# Patient Record
Sex: FEMALE | Race: WHITE | NOT HISPANIC OR LATINO | Employment: PART TIME
[De-identification: names, ages, dates, MRNs, and addresses within clinical notes are randomized per-mention and may not be internally consistent; named-entity substitution may affect disease eponyms.]

---

## 2019-02-15 ENCOUNTER — RECORDS - HEALTHEAST (OUTPATIENT)
Dept: ADMINISTRATIVE | Facility: OTHER | Age: 59
End: 2019-02-15

## 2019-02-15 LAB
LAB AP CHARGES (HE HISTORICAL CONVERSION): NORMAL
PATH REPORT.COMMENTS IMP SPEC: NORMAL
PATH REPORT.COMMENTS IMP SPEC: NORMAL
PATH REPORT.FINAL DX SPEC: NORMAL
PATH REPORT.GROSS SPEC: NORMAL
PATH REPORT.MICROSCOPIC SPEC OTHER STN: NORMAL
PATH REPORT.MICROSCOPIC SPEC OTHER STN: NORMAL
PATH REPORT.RELEVANT HX SPEC: NORMAL
RESULT FLAG (HE HISTORICAL CONVERSION): NORMAL

## 2020-10-05 ENCOUNTER — OFFICE VISIT - HEALTHEAST (OUTPATIENT)
Dept: FAMILY MEDICINE | Facility: CLINIC | Age: 60
End: 2020-10-05

## 2020-10-05 DIAGNOSIS — G43.009 MIGRAINE WITHOUT AURA AND WITHOUT STATUS MIGRAINOSUS, NOT INTRACTABLE: ICD-10-CM

## 2020-10-05 ASSESSMENT — MIFFLIN-ST. JEOR: SCORE: 1547.46

## 2020-10-13 ENCOUNTER — AMBULATORY - HEALTHEAST (OUTPATIENT)
Dept: FAMILY MEDICINE | Facility: CLINIC | Age: 60
End: 2020-10-13

## 2020-10-13 DIAGNOSIS — G43.009 MIGRAINE WITHOUT AURA AND WITHOUT STATUS MIGRAINOSUS, NOT INTRACTABLE: ICD-10-CM

## 2020-11-05 ENCOUNTER — RECORDS - HEALTHEAST (OUTPATIENT)
Dept: ADMINISTRATIVE | Facility: OTHER | Age: 60
End: 2020-11-05

## 2020-11-05 ENCOUNTER — OFFICE VISIT (OUTPATIENT)
Dept: NEUROLOGY | Facility: CLINIC | Age: 60
End: 2020-11-05
Payer: COMMERCIAL

## 2020-11-05 VITALS
HEIGHT: 70 IN | WEIGHT: 190 LBS | DIASTOLIC BLOOD PRESSURE: 66 MMHG | SYSTOLIC BLOOD PRESSURE: 108 MMHG | HEART RATE: 77 BPM | RESPIRATION RATE: 16 BRPM | BODY MASS INDEX: 27.2 KG/M2

## 2020-11-05 DIAGNOSIS — R20.0 RIGHT ARM NUMBNESS: ICD-10-CM

## 2020-11-05 DIAGNOSIS — M48.02 CERVICAL STENOSIS OF SPINAL CANAL: ICD-10-CM

## 2020-11-05 DIAGNOSIS — G43.009 NONINTRACTABLE MIGRAINE, UNSPECIFIED MIGRAINE TYPE: Primary | ICD-10-CM

## 2020-11-05 PROBLEM — C44.91 BASAL CELL CARCINOMA OF SKIN: Status: ACTIVE | Noted: 2020-11-05

## 2020-11-05 PROBLEM — R87.619 ENDOMETRIAL CELLS ON CERVICAL PAP SMEAR INCONSISTENT WITH LAST MENSTRUAL PERIOD: Status: ACTIVE | Noted: 2017-12-27

## 2020-11-05 PROBLEM — Z83.3 FAMILY HISTORY OF DIABETES MELLITUS: Status: ACTIVE | Noted: 2020-02-21

## 2020-11-05 PROBLEM — D36.9 ADENOMATOUS POLYP: Status: ACTIVE | Noted: 2020-03-17

## 2020-11-05 PROBLEM — E03.9 HYPOTHYROIDISM: Status: ACTIVE | Noted: 2020-11-05

## 2020-11-05 PROCEDURE — 99205 OFFICE O/P NEW HI 60 MIN: CPT | Performed by: PSYCHIATRY & NEUROLOGY

## 2020-11-05 RX ORDER — RIZATRIPTAN BENZOATE 10 MG/1
1 TABLET ORAL PRN
COMMUNITY
Start: 2020-10-13 | End: 2020-11-05

## 2020-11-05 RX ORDER — RIZATRIPTAN BENZOATE 10 MG/1
10 TABLET ORAL PRN
Qty: 18 TABLET | Refills: 11 | Status: SHIPPED | OUTPATIENT
Start: 2020-11-05 | End: 2021-12-01

## 2020-11-05 RX ORDER — GABAPENTIN 300 MG/1
300 CAPSULE ORAL AT BEDTIME
COMMUNITY
Start: 2020-10-05

## 2020-11-05 RX ORDER — CYCLOBENZAPRINE HCL 10 MG
10 TABLET ORAL
COMMUNITY
Start: 2020-01-29 | End: 2023-03-02

## 2020-11-05 ASSESSMENT — MIFFLIN-ST. JEOR: SCORE: 1517.08

## 2020-11-05 NOTE — PATIENT INSTRUCTIONS
Patient Education   About Migraine Headaches  What is a migraine headache?  A migraine is a very painful type of headache. It may last a few hours or days. During a migraine, you may have vision problems and feel sick to your stomach.  Migraines are three times more common in women than in men. Once they start, you may get them for the rest of your life. They may occur less often as you age.  What causes it?  We don't know the exact cause, but many things can trigger a migraine. These include:    Stress and anxiety    Lack of food or sleep    Foods and drinks that contain tyramine, such as:  ? Red preet and some beers  ? Aged cheeses (like cheddar or blue cheese)  ? Yeast  ? Aged, dried or cured meats  ? Fermented foods like sauerkraut, soy sauce, miso and cris chi    Too much light    Chemicals (gasoline, cleaning products, perfume, glue, etc.)    Weather changes    Certain medicines    Hormone changes (in women).  What are symptoms?  Some people can tell when they're about to have a migraine. They may see flashing lights or zigzag lines in front of their eyes. Or they may lose their vision for a short time.  With a migraine, you may:    Feel pain or pulsing on one side of the head. For some people, the entire head hurts.    Be very sensitive to light and sound.    Feel nauseated (sick to your stomach) and vomit (throw up).  How is it treated?  Your care team may suggest medicine to prevent or relieve your symptoms. Once you start having migraines, you may also need medicine to keep them from getting worse.   Take your medicine at the first sign of a migraine. It may take several tries to find a medicine that works for you.   When a migraine comes:    Lie down in a quiet, dark room. Try not to bend over, as this may cause more pain.    Put a cold pack on your head. Try a bag of frozen vegetables, wrapped with a thin cloth.    Drink extra fluids. If you can't drink, suck on ice chips.  How can I prevent  migraines?  It will help to keep a migraine diary. By keeping a diary, you may find the cause of your headaches. Once you know the cause, you can take steps to prevent migraines in the future.  It also helps to live a healthy lifestyle. This means:    Get regular exercise. (If exercise triggers your headaches, tell your doctor.)    Drink plenty of water.    Eat healthy meals at regular times.    Try to go to bed and get up and regular times.    Don't smoke. Avoid second-hand smoke.    Avoid caffeine. Coffee, tea and soda may help a migraine. But if you drink them too often, they can cause migraines.    Find ways to relax, have fun and reduce stress in your life.    Try complementary therapies (yoga, acupuncture, massage, biofeedback, etc.).  When should I call my clinic?  Call your clinic at once if you have new or unusual symptoms, such as:     Pain that gets worse or lasts more than 24 hours.    Slurred speech or problems talking.    A weak arm or leg that you can't move normally.    A fever over 100 F (37.8 C), taken under the tongue.    Stiff neck.    Vomiting (throwing up) for several hours.  For more information about migraines  Contact the [employer] [place] [employer] (ACHE) at 1-998.197.5316 or www.headaches.org.  Local providers of complementary therapies  These services may not be covered by insurance. Check your insurance plan.  [facility]  [phone]   Includes pain education, behavioral therapy,   trigger point injections and more.  [facility]   [phone]   Includes acupuncture, massage, myofascial release.  [facility] the [facility]  [phone]  [email or web address]  Includes mindfulness, meditation, yoga.  Community Education     [place]: sreedhar.mpls.[email or web address].mn.    [place]: commedprograms.spps.org  Look for programs on yoga, mindfulness, etc.  [facility]    [phone]  www.pathways[place].org  Includes mindfulness, yoga, body-mind skills.  For informational purposes only. Not to replace the advice of your health care provider.   Copyright   2011 [employer]. All rights reserved. [employer] 089379 - 11/15.

## 2020-11-05 NOTE — LETTER
11/5/2020         RE: Breana Santos  [address] [address]        Dear Colleague,    Thank you for referring your patient, Breana Santos, to the [facility] NEUROLOGY CLINIC [place]. Please see a copy of my visit note below.    [facility] Neurology  [place]    Breana Santos MRN# [id number]   Age: 60 year old YOB: 1960               Assessment and Plan:   Assessment:   Migraine headaches  Right arm numbness        Plan:   Orders Placed This Encounter   Procedures     MR Cervical Spine w/o Contrast     MR Brain w/o Contrast     EMG     She was not enthusiastic about starting prophylactic medication for the migraines.  We will continue the rizatriptan as needed.  We did discuss different medications to try in the future if needed.  Since there has been an increase in headaches in recent months I would like to get an MRI of the brain just to rule out any structural lesion.  We will also get MRI of the cervical spine given the symptoms of right arm numbness.  We will also have her come back for EMG of that right arm to look for any evidence of nerve root or peripheral nerve impingement.  I will see her when she comes back for the EMG and we should have the MRI results at that time as well.             Chief Complaint/HPI:     I saw Breana for neurologic evaluation today here in our [place] office.  This is a 60-year-old woman with a longstanding history of migraine headaches.  Unfortunately they have gotten worse in recent months.  The began in her 20s, mainly they were related to her menstrual cycle.  Now that she no longer has a cycle the migraines are more frequent.  On average she gets about 6/month.  With keeping track, she noted that she had 6 days of migraine in a row back in September.  There were 7 migraine days in October.  The pain is mainly in the right temporal and central areas.  It is very sharp.  She also has a dull ache near the right  temporomandibular joint.  This is not triggered by chewing or swallowing or light touch on the skin.  With the headaches, she has no aura and no nausea, but she is very sensitive to light and noise.  She takes rizatriptan as needed for the migraines with very good relief.  Her sister had side effects from topiramate so Breana was not enthusiastic about trying that.    Separately she is noticed episodes of numbness in the right arm.  She will wake up with this, she notes that she is not lying on the arm.  She believes that she wakes up with the right arm lying on her abdomen (supine).  She mentions an MRI of the cervical spine a few years ago that did show some degenerative changes.  In the meantime she has had some left neck and shoulder pain which resolved nicely with physical therapy.  She continues doing the exercises though has not been back to the physical therapy clinic with the pandemic quarantine.          Past Medical History:    has a past medical history of Migraines.          Past Surgical History:    has no past surgical history on file.          Social History:     Social History     Tobacco Use     Smoking status: Former Smoker     Packs/day: 0.50     Years: 22.00     Pack years: 11.00     Types: Cigarettes     Smokeless tobacco: Never Used     Tobacco comment: quit in 1998   Substance Use Topics     Alcohol use: Yes     Comment: 1-2 drinks a month              Family History:     Family History   Problem Relation Age of Onset     Diabetes Mother      Heart Failure Mother      Diabetes Father      Heart Failure Father                 Allergies:     Allergies   Allergen Reactions     Shellfish Allergy Anaphylaxis             Medications:     Current Outpatient Medications:      cyclobenzaprine (FLEXERIL) 10 MG tablet, Take 10 mg by mouth nightly as needed, Disp: , Rfl:      gabapentin (NEURONTIN) 300 MG capsule, Take 300 mg by mouth At Bedtime, Disp: , Rfl:      rizatriptan (MAXALT) 10 MG tablet, Take  "1 tablet (10 mg) by mouth as needed for migraine, Disp: 18 tablet, Rfl: 11           Review of Systems:   Other than the above-mentioned symptoms a complete systems review is negative.            Physical Exam:      Vitals: /66 (BP Location: Right arm, Patient Position: Sitting, Cuff Size: Adult Regular)   Pulse 77   Resp 16   Ht 1.778 m (5' 10\")   Wt 86.2 kg (190 lb)   Breastfeeding No   BMI 27.26 kg/m    BMI= Body mass index is 27.26 kg/m .     Patient is alert and oriented x 3 in no acute distress. Neck was supple, no carotid bruits, thyromegaly, lymphadenopathy or JVD noted.   Neurological Exam:   Mental status: Patient is alert and oriented x 3. Speech is clear and fluent with good repetition, comprehension and naming. Patient recalls 3/3 objects at 5 minutes.   Cranial nerves:    CN II: Visual fields are full to confrontation. Fundoscopic exam is normal. PERRLA.   CN III, IV, VI: EOMI.    CN V: Facial sensation intact to pinprick in all 3 divisions bilaterally.    CN VII: Face is symmetric with normal eye closure and smile.   CN VII: Hearing is normal to rubbing fingers   CN IX, X: Palate elevates symmetrically. Phonation is normal. Gag present.   CN XI: Head turning and shoulder shrug are intact   CN XII: Tongue is midline with normal movements and no atrophy or fasciculations.   Motor: Muscle bulk and tone are normal. No pronator drift. Strength is 5/5 bilaterally. No fasciculations noted.   Reflexes: Reflexes are symmetric, diminished throughout. Plantar responses are flexor.   Sensory: Light touch, pinprick, and vibration sense are intact bilaterally.    Coordination: Rapid alternating movements and fine finger movements are intact. There is no dysmetria on finger-to-nose and heel-knee-shin.    Gait: Posture is normal. Gait is steady with normal steps, base, arm swing, and turning.    I did review records available through care everywhere including MRI of the cervical spine done through the " [facility]         Sean Villalpando MD             Again, thank you for allowing me to participate in the care of your patient.        Sincerely,        Sean Villalpando MD

## 2020-11-05 NOTE — PROGRESS NOTES
[facility] Neurology  [place]    Breana Santos MRN# [id number]   Age: 60 year old YOB: 1960               Assessment and Plan:   Assessment:   Migraine headaches  Right arm numbness        Plan:   Orders Placed This Encounter   Procedures     MR Cervical Spine w/o Contrast     MR Brain w/o Contrast     EMG     She was not enthusiastic about starting prophylactic medication for the migraines.  We will continue the rizatriptan as needed.  We did discuss different medications to try in the future if needed.  Since there has been an increase in headaches in recent months I would like to get an MRI of the brain just to rule out any structural lesion.  We will also get MRI of the cervical spine given the symptoms of right arm numbness.  We will also have her come back for EMG of that right arm to look for any evidence of nerve root or peripheral nerve impingement.  I will see her when she comes back for the EMG and we should have the MRI results at that time as well.             Chief Complaint/HPI:     I saw Breana for neurologic evaluation today here in our [place] office.  This is a 60-year-old woman with a longstanding history of migraine headaches.  Unfortunately they have gotten worse in recent months.  The began in her 20s, mainly they were related to her menstrual cycle.  Now that she no longer has a cycle the migraines are more frequent.  On average she gets about 6/month.  With keeping track, she noted that she had 6 days of migraine in a row back in September.  There were 7 migraine days in October.  The pain is mainly in the right temporal and central areas.  It is very sharp.  She also has a dull ache near the right temporomandibular joint.  This is not triggered by chewing or swallowing or light touch on the skin.  With the headaches, she has no aura and no nausea, but she is very sensitive to light and noise.  She takes rizatriptan as needed for the migraines with very good relief.   Her sister had side effects from topiramate so Breana was not enthusiastic about trying that.    Separately she is noticed episodes of numbness in the right arm.  She will wake up with this, she notes that she is not lying on the arm.  She believes that she wakes up with the right arm lying on her abdomen (supine).  She mentions an MRI of the cervical spine a few years ago that did show some degenerative changes.  In the meantime she has had some left neck and shoulder pain which resolved nicely with physical therapy.  She continues doing the exercises though has not been back to the physical therapy clinic with the pandemic quarantine.          Past Medical History:    has a past medical history of Migraines.          Past Surgical History:    has no past surgical history on file.          Social History:     Social History     Tobacco Use     Smoking status: Former Smoker     Packs/day: 0.50     Years: 22.00     Pack years: 11.00     Types: Cigarettes     Smokeless tobacco: Never Used     Tobacco comment: quit in 1998   Substance Use Topics     Alcohol use: Yes     Comment: 1-2 drinks a month              Family History:     Family History   Problem Relation Age of Onset     Diabetes Mother      Heart Failure Mother      Diabetes Father      Heart Failure Father                 Allergies:     Allergies   Allergen Reactions     Shellfish Allergy Anaphylaxis             Medications:     Current Outpatient Medications:      cyclobenzaprine (FLEXERIL) 10 MG tablet, Take 10 mg by mouth nightly as needed, Disp: , Rfl:      gabapentin (NEURONTIN) 300 MG capsule, Take 300 mg by mouth At Bedtime, Disp: , Rfl:      rizatriptan (MAXALT) 10 MG tablet, Take 1 tablet (10 mg) by mouth as needed for migraine, Disp: 18 tablet, Rfl: 11           Review of Systems:   Other than the above-mentioned symptoms a complete systems review is negative.            Physical Exam:      Vitals: /66 (BP Location: Right arm, Patient  "Position: Sitting, Cuff Size: Adult Regular)   Pulse 77   Resp 16   Ht 1.778 m (5' 10\")   Wt 86.2 kg (190 lb)   Breastfeeding No   BMI 27.26 kg/m    BMI= Body mass index is 27.26 kg/m .     Patient is alert and oriented x 3 in no acute distress. Neck was supple, no carotid bruits, thyromegaly, lymphadenopathy or JVD noted.   Neurological Exam:   Mental status: Patient is alert and oriented x 3. Speech is clear and fluent with good repetition, comprehension and naming. Patient recalls 3/3 objects at 5 minutes.   Cranial nerves:    CN II: Visual fields are full to confrontation. Fundoscopic exam is normal. PERRLA.   CN III, IV, VI: EOMI.    CN V: Facial sensation intact to pinprick in all 3 divisions bilaterally.    CN VII: Face is symmetric with normal eye closure and smile.   CN VII: Hearing is normal to rubbing fingers   CN IX, X: Palate elevates symmetrically. Phonation is normal. Gag present.   CN XI: Head turning and shoulder shrug are intact   CN XII: Tongue is midline with normal movements and no atrophy or fasciculations.   Motor: Muscle bulk and tone are normal. No pronator drift. Strength is 5/5 bilaterally. No fasciculations noted.   Reflexes: Reflexes are symmetric, diminished throughout. Plantar responses are flexor.   Sensory: Light touch, pinprick, and vibration sense are intact bilaterally.    Coordination: Rapid alternating movements and fine finger movements are intact. There is no dysmetria on finger-to-nose and heel-knee-shin.    Gait: Posture is normal. Gait is steady with normal steps, base, arm swing, and turning.    I did review records available through care everywhere including MRI of the cervical spine done through the [facility].         Sean Villalpando MD         "

## 2020-12-16 DIAGNOSIS — G43.009 NONINTRACTABLE MIGRAINE, UNSPECIFIED MIGRAINE TYPE: Primary | ICD-10-CM

## 2020-12-16 NOTE — TELEPHONE ENCOUNTER
Cataract symptoms i.e., glare, blur discussed. Pt to call if worsening vision or trouble with driving, TV, reading, ADL. UV precautions. Reviewed possibility of future cataract surgery. Dr. Villalpando-  Pt requesting Ativan to take prior to MRI 1/4/21.  Maxalt refill was already sent on 11/5/20, with 11 refills.  Arleth Harrison LPN on 12/16/2020 at 3:45 PM

## 2020-12-16 NOTE — TELEPHONE ENCOUNTER
Pt calling for a refill of Maxalt and the need for Ativan sedation for MRI. She uses [facility] on [address]. Call if questions [phone]

## 2020-12-17 RX ORDER — LORAZEPAM 1 MG/1
2 TABLET ORAL ONCE
Qty: 2 TABLET | Refills: 0 | Status: SHIPPED | OUTPATIENT
Start: 2020-12-17 | End: 2020-12-17

## 2021-01-04 ENCOUNTER — HOSPITAL ENCOUNTER (OUTPATIENT)
Dept: MRI IMAGING | Facility: CLINIC | Age: 61
Discharge: HOME | End: 2021-01-04
Attending: PSYCHIATRY & NEUROLOGY

## 2021-01-04 DIAGNOSIS — R20.0 RIGHT ARM NUMBNESS: ICD-10-CM

## 2021-01-04 DIAGNOSIS — G43.009 NONINTRACTABLE MIGRAINE: ICD-10-CM

## 2021-01-04 DIAGNOSIS — M48.02 CERVICAL STENOSIS OF SPINAL CANAL: ICD-10-CM

## 2021-01-27 ENCOUNTER — OFFICE VISIT (OUTPATIENT)
Dept: NEUROLOGY | Facility: CLINIC | Age: 61
End: 2021-01-27
Attending: PSYCHIATRY & NEUROLOGY
Payer: COMMERCIAL

## 2021-01-27 VITALS
HEIGHT: 70 IN | DIASTOLIC BLOOD PRESSURE: 51 MMHG | SYSTOLIC BLOOD PRESSURE: 99 MMHG | HEART RATE: 84 BPM | BODY MASS INDEX: 27.2 KG/M2 | WEIGHT: 190 LBS | RESPIRATION RATE: 16 BRPM

## 2021-01-27 DIAGNOSIS — R20.0 RIGHT ARM NUMBNESS: ICD-10-CM

## 2021-01-27 DIAGNOSIS — M48.02 CERVICAL STENOSIS OF SPINAL CANAL: ICD-10-CM

## 2021-01-27 DIAGNOSIS — G43.009 NONINTRACTABLE MIGRAINE, UNSPECIFIED MIGRAINE TYPE: ICD-10-CM

## 2021-01-27 DIAGNOSIS — G43.009 NONINTRACTABLE MIGRAINE, UNSPECIFIED MIGRAINE TYPE: Primary | ICD-10-CM

## 2021-01-27 PROCEDURE — 95909 NRV CNDJ TST 5-6 STUDIES: CPT | Performed by: PSYCHIATRY & NEUROLOGY

## 2021-01-27 PROCEDURE — 99214 OFFICE O/P EST MOD 30 MIN: CPT | Performed by: PSYCHIATRY & NEUROLOGY

## 2021-01-27 PROCEDURE — 95886 MUSC TEST DONE W/N TEST COMP: CPT | Mod: RT | Performed by: PSYCHIATRY & NEUROLOGY

## 2021-01-27 ASSESSMENT — MIFFLIN-ST. JEOR: SCORE: 1517.08

## 2021-01-27 NOTE — LETTER
1/27/2021         RE: Vanessa Santos  [address] [address]        Dear Colleague,    Thank you for referring your patient, Vanessa Santos, to the [facility] NEUROLOGY CLINIC [place]. Please see a copy of my visit note below.    [facility] Neurology  [place]    Vanessa Santos MRN# [id number]   Age: 60 year old YOB: 1960               Assessment and Plan:   Assessment:   Migraine headaches  Right arm numbness, resolved        Plan:   Orders Placed This Encounter   Procedures     XR Great Occipital Nerve Block Injection     She will continue with the rizatriptan as needed.  I suggested Fioricet as well so that she could alternate headache medications (in an effort to reduce rebound headache) but she was not enthusiastic about the caffeine involved since her headaches can come on at night.  In lieu of that I gave her the option to take ibuprofen combined with Tylenol and alternate that with the rizatriptan.    We will send her for occipital nerve block on the right given the distribution of the headache.             Chief Complaint/HPI:     I saw this patient for follow-up today.  We had her come in for an EMG to evaluate episodes of right arm numbness (especially upon awakening).  Since we saw her in November, she got a new pillow with more support and actually feels that her symptoms have improved dramatically.  She does still get fairly frequent migraine headaches with light and sound sensitivity but no nausea.  The headache seems to start posteriorly on the right and spreads forward to the temporal area.  Maxalt is effective for her but she worries that she is taking it too often and could get rebound headache.  She might have a headache for 3 days in a row and then go a few days without headache.            Past Medical History:    has a past medical history of Migraines.          Past Surgical History:    has no past surgical history on file.           "Social History:     Social History     Tobacco Use     Smoking status: Former Smoker     Packs/day: 0.50     Years: 22.00     Pack years: 11.00     Types: Cigarettes     Smokeless tobacco: Never Used     Tobacco comment: quit in 1998   Substance Use Topics     Alcohol use: Yes     Comment: 1-2 drinks a month              Family History:     Family History   Problem Relation Age of Onset     Diabetes Mother      Heart Failure Mother      Diabetes Father      Heart Failure Father                 Allergies:     Allergies   Allergen Reactions     Shellfish Allergy Anaphylaxis             Medications:     Current Outpatient Medications:      cyclobenzaprine (FLEXERIL) 10 MG tablet, Take 10 mg by mouth nightly as needed, Disp: , Rfl:      rizatriptan (MAXALT) 10 MG tablet, Take 1 tablet (10 mg) by mouth as needed for migraine, Disp: 18 tablet, Rfl: 11     gabapentin (NEURONTIN) 300 MG capsule, Take 300 mg by mouth At Bedtime, Disp: , Rfl:               Physical Exam:     BP 99/51 (BP Location: Left arm, Patient Position: Sitting, Cuff Size: Adult Regular)   Pulse 84   Resp 16   Ht 1.778 m (5' 10\")   Wt 86.2 kg (190 lb)   LMP  (LMP Unknown)   Breastfeeding No   BMI 27.26 kg/m     Awake, alert, no aphasia, no dysarthria  Oriented x3, attention is fine  Cranial nerves II - XII tested and intact, no nystagmus  There is no focal or generalized weakness in the extremities  Gait is normal     MRI of the cervical spine shows some wear and tear, moderate canal stenosis at C5-6 with foraminal stenosis there as well (though EMG did not show any sign of radiculopathy).  Images personally reviewed      Sean Villalpando MD             Again, thank you for allowing me to participate in the care of your patient.        Sincerely,        Sean Villalpando MD    "

## 2021-01-27 NOTE — PROGRESS NOTES
[facility] Neurology  [place]    Vanessa Santos MRN# [id number]   Age: 60 year old YOB: 1960               Assessment and Plan:   Assessment:   Migraine headaches  Right arm numbness, resolved        Plan:   Orders Placed This Encounter   Procedures     XR Great Occipital Nerve Block Injection     She will continue with the rizatriptan as needed.  I suggested Fioricet as well so that she could alternate headache medications (in an effort to reduce rebound headache) but she was not enthusiastic about the caffeine involved since her headaches can come on at night.  In lieu of that I gave her the option to take ibuprofen combined with Tylenol and alternate that with the rizatriptan.    We will send her for occipital nerve block on the right given the distribution of the headache.             Chief Complaint/HPI:     I saw this patient for follow-up today.  We had her come in for an EMG to evaluate episodes of right arm numbness (especially upon awakening).  Since we saw her in November, she got a new pillow with more support and actually feels that her symptoms have improved dramatically.  She does still get fairly frequent migraine headaches with light and sound sensitivity but no nausea.  The headache seems to start posteriorly on the right and spreads forward to the temporal area.  Maxalt is effective for her but she worries that she is taking it too often and could get rebound headache.  She might have a headache for 3 days in a row and then go a few days without headache.            Past Medical History:    has a past medical history of Migraines.          Past Surgical History:    has no past surgical history on file.          Social History:     Social History     Tobacco Use     Smoking status: Former Smoker     Packs/day: 0.50     Years: 22.00     Pack years: 11.00     Types: Cigarettes     Smokeless tobacco: Never Used     Tobacco comment: quit in 1998   Substance Use Topics     Alcohol  "use: Yes     Comment: 1-2 drinks a month              Family History:     Family History   Problem Relation Age of Onset     Diabetes Mother      Heart Failure Mother      Diabetes Father      Heart Failure Father                 Allergies:     Allergies   Allergen Reactions     Shellfish Allergy Anaphylaxis             Medications:     Current Outpatient Medications:      cyclobenzaprine (FLEXERIL) 10 MG tablet, Take 10 mg by mouth nightly as needed, Disp: , Rfl:      rizatriptan (MAXALT) 10 MG tablet, Take 1 tablet (10 mg) by mouth as needed for migraine, Disp: 18 tablet, Rfl: 11     gabapentin (NEURONTIN) 300 MG capsule, Take 300 mg by mouth At Bedtime, Disp: , Rfl:               Physical Exam:     BP 99/51 (BP Location: Left arm, Patient Position: Sitting, Cuff Size: Adult Regular)   Pulse 84   Resp 16   Ht 1.778 m (5' 10\")   Wt 86.2 kg (190 lb)   LMP  (LMP Unknown)   Breastfeeding No   BMI 27.26 kg/m     Awake, alert, no aphasia, no dysarthria  Oriented x3, attention is fine  Cranial nerves II - XII tested and intact, no nystagmus  There is no focal or generalized weakness in the extremities  Gait is normal     MRI of the cervical spine shows some wear and tear, moderate canal stenosis at C5-6 with foraminal stenosis there as well (though EMG did not show any sign of radiculopathy).  Images personally reviewed      Sean Villalpando MD         "

## 2021-01-27 NOTE — LETTER
1/27/2021         RE: Vanessa Santos  [address] [address]        Dear Colleague,    Thank you for referring your patient, Vanessa Santos, to the [facility] [place]. Please see a copy of my visit note below.    Epic requires a note here      Again, thank you for allowing me to participate in the care of your patient.        Sincerely,        Sean Villalpando MD

## 2021-01-27 NOTE — PROCEDURES
[facility]     Formerly [facility]  [address], [address]  [place] [address]  Tel: [phone]  Fax: [phone]          Full Name: Vanessa Santos Gender: Female  Patient ID: [id number] YOB: 1960      Visit Date: 1/27/2021 08:02  Age: 60 Years 11 Months Old  Interpreted By: Sean Villalpando M.D.   Ref Dr.: Geraldine Carr MD  Tech: ST   Height: 5 feet 10 inch  Reason for referral: Evaluate right upper. c/o numbness in right shoulder, radiate down right arm for a month. Feel symptoms has resolved due to changing pillow.       Motor NCS      Nerve / Sites Lat Amp Dist Leno    ms mV cm m/s   R Median - APB      Wrist 3.65 7.1 7       Elbow 8.28 7.2 26 56   R Ulnar - ADM      Wrist 3.13 11.1 7       B.Elbow 6.35 10.0 20.5 63      A.Elbow 8.07 9.7 10 58       F  Wave      Nerve Fmin    ms   R Ulnar - ADM 27.71       Sensory NCS      Nerve / Sites Onset Lat Pk Lat Amp.2-3 Dist Lat Diff    ms ms  V cm ms   R Median - II (Antidr)      Wrist 2.34 3.02 67.0 13    R Ulnar - V (Antidr)      Wrist 2.19 2.81 43.5 11    R Median, Ulnar - Transcarpal comparison      Median Palm 1.51 2.08 44.7 8       Ulnar Palm 1.51 1.98 27.5 8         0.10       EMG Summary Table     Spontaneous MUAP Rcmt Note   Muscle Fib PSW Fasc IA # Amp Dur PPP Rate Type   R. Brachioradialis None None None N N N N N N N   R. Pronator teres None None None N N N N N N N   R. Biceps brachii None None None N N N N N N N   R. Deltoid None None None N N N N N N N   R. Triceps brachii None None None N N N N N N N   R. Flexor carpi ulnaris None None None N N N N N N N   R. First dorsal interosseous None None None N N N N N N N   R. Abductor pollicis brevis None None None N N N N N N N   R. Supraspinatus None None None N N N N N N N   R. Infraspinatus None None None N N N N N N N     History:  This patient is a 60-year-old woman with episodes of numbness in the right shoulder and arm and  a history of cervical spine stenosis.  This EMG is performed to evaluate for nerve root impingement.  Of note, symptoms have improved with change in pillow.    Findings:  Motor nerve conduction studies of the right median and right ulnar nerves are well within normal limits.  F-wave latency in the right ulnar nerve is normal.    Sensory studies of the right median and ulnar nerves are normal with robust amplitudes.    Needle EMG of selected muscles throughout the right arm including shoulder girdle muscles showed no abnormal spontaneous activity and motor units of normal configuration and recruitment.    Conclusion:  This is a normal EMG of the right arm.

## 2021-02-22 ENCOUNTER — TELEPHONE (OUTPATIENT)
Dept: NEUROLOGY | Facility: CLINIC | Age: 61
End: 2021-02-22

## 2021-02-22 NOTE — TELEPHONE ENCOUNTER
Please advise. I see the order states left sided?   Miguel Rudolph, SHAKIR on 2/22/2021 at 12:29 PM

## 2021-02-22 NOTE — TELEPHONE ENCOUNTER
Candace with [facility] left msg requesting to specify right or left or bilateral for the occipital block. [phone] (condifential vm - can leave msg)

## 2021-02-23 ENCOUNTER — TRANSFERRED RECORDS (OUTPATIENT)
Dept: HEALTH INFORMATION MANAGEMENT | Facility: CLINIC | Age: 61
End: 2021-02-23

## 2021-03-07 ENCOUNTER — HEALTH MAINTENANCE LETTER (OUTPATIENT)
Age: 61
End: 2021-03-07

## 2021-06-05 VITALS
DIASTOLIC BLOOD PRESSURE: 82 MMHG | HEIGHT: 70 IN | SYSTOLIC BLOOD PRESSURE: 122 MMHG | BODY MASS INDEX: 28.32 KG/M2 | WEIGHT: 197.8 LBS | HEART RATE: 80 BPM

## 2021-06-12 NOTE — PROGRESS NOTES
ASSESSMENT:  1. Migraine without aura and without status migrainosus, not intractable  - eletriptan (RELPAX) 20 MG tablet; Take 1 tablet (20 mg total) by mouth as needed. may repeat in 2 hours if necessary  Dispense: 20 tablet; Refill: 2  - Ambulatory referral to Neurology  - gabapentin (NEURONTIN) 300 MG capsule; Take 1 capsule (300 mg total) by mouth 2 (two) times a day as needed.  Dispense: 60 capsule; Refill: 0      PLAN:  I did review with her the previous management of the migraine.  I did give a prescription of Relpax since it was effective for her will hope that insurance will cover it at this point.  I encouraged her to increase the dosage of the gabapentin since she is not going to take the Topamax so that was either be helpful for her.  Will have her referred to see the headache clinic at the [facility].  She is encouraged to call to let us know if there is any other concerns that she might have.  Otherwise we will see her for follow-up as needed.    Orders Placed This Encounter   Procedures     Influenza, Recombinant, Inj, Quadrivalent, PF, 18+YRS     Ambulatory referral to Neurology     Referral Priority:   Routine     Referral Type:   Consultation     Referral Reason:   Evaluation and Treatment     Referral Location:   [facility]     Requested Specialty:   Neurology     Number of Visits Requested:   1     Medications Discontinued During This Encounter   Medication Reason     SUMAtriptan (IMITREX) 50 MG tablet      gabapentin (NEURONTIN) 100 MG capsule Reorder       No follow-ups on file.      CHIEF COMPLAINT:  Chief Complaint   Patient presents with     Headache     recurrent, hx of migraines, getting more frequent, in the last week she has gotten 5       HISTORY OF PRESENT ILLNESS:  Breana is a 60 y.o. female presenting to the clinic today as a new patient she has a diagnosis of migraine which was done a long time ago and noted that she has been able to control it but noted that this time  around she has not been able to control it.  She noted having increasing migraines sometimes up to 5 times in a week.  She denies having any nausea vomiting.  Noted that the headache is mainly along the right side temple.  Also pain noted around the right side behind the eye.  She does have some sensitivity to light as well as to noise..  Noted that it appears to happen more at night and wake her at night.  She also has some pain noted in the front of the ear which the  the dentist thought that it is a facial nerve problem.  She also has a past history of cervical radiculopathy which she have managed with physical therapy and is much better at this point.  She does take gabapentin for the neck pain.  She takes her Imitrex which initially has been quite helpful but currently does not help at all.  She was prescribed recently with Topamax but she was afraid to take it with gabapentin because of mental issues that it might give which she noted a cystic duct.  She is not really sure what to do at this point.  She did take the tablet of Relpax from her brother and that did help.     REVIEW OF SYSTEMS:   She denied having any difficulty sleeping other than being woken up, she does not snore and noted no recent abnormal weight gain.  She does not have any lightheadedness and no problems with her balance.  No smoking, noted that she also does not drink since alcohol gives her migraine.  Denied any other symptoms at this point and feels well otherwise.  All other systems are negative.    PFSH:  Reviewed, as below.    Social History     Tobacco Use   Smoking Status Former Smoker     Quit date: 10/5/2013     Years since quittin.0   Smokeless Tobacco Never Used       Family History   Problem Relation Age of Onset     Migraines Sister      Migraines Brother        Social History     Socioeconomic History     Marital status:      Spouse name: Not on file     Number of children: Not on file     Years of  education: Not on file     Highest education level: Not on file   Occupational History     Not on file   Social Needs     Financial resource strain: Not on file     Food insecurity     Worry: Not on file     Inability: Not on file     Transportation needs     Medical: Not on file     Non-medical: Not on file   Tobacco Use     Smoking status: Former Smoker     Quit date: 10/5/2013     Years since quittin.0     Smokeless tobacco: Never Used   Substance and Sexual Activity     Alcohol use: Not on file     Comment: Not frequently     Drug use: Not on file     Sexual activity: Yes     Partners: Male   Lifestyle     Physical activity     Days per week: Not on file     Minutes per session: Not on file     Stress: Not on file   Relationships     Social connections     Talks on phone: Not on file     Gets together: Not on file     Attends Confucianism service: Not on file     Active member of club or organization: Not on file     Attends meetings of clubs or organizations: Not on file     Relationship status: Not on file     Intimate partner violence     Fear of current or ex partner: Not on file     Emotionally abused: Not on file     Physically abused: Not on file     Forced sexual activity: Not on file   Other Topics Concern     Not on file   Social History Narrative     Not on file       Past Surgical History:   Procedure Laterality Date     BREAST SURGERY      Lumpectomy for Hyperplasia     TONSILECTOMY, ADENOIDECTOMY, BILATERAL MYRINGOTOMY AND TUBES         Allergies   Allergen Reactions     Shellfish Containing Products Anaphylaxis       Active Ambulatory Problems     Diagnosis Date Noted     No Active Ambulatory Problems     Resolved Ambulatory Problems     Diagnosis Date Noted     No Resolved Ambulatory Problems     No Additional Past Medical History       Current Outpatient Medications   Medication Sig Dispense Refill     gabapentin (NEURONTIN) 300 MG capsule Take 1 capsule (300 mg total) by mouth 2 (two) times a  "day as needed. 60 capsule 0     eletriptan (RELPAX) 20 MG tablet Take 1 tablet (20 mg total) by mouth as needed. may repeat in 2 hours if necessary 20 tablet 2     No current facility-administered medications for this visit.        VITALS:  Vitals:    10/05/20 1005   BP: 122/82   Patient Site: Left Arm   Patient Position: Sitting   Cuff Size: Adult Large   Pulse: 80   Weight: 197 lb 12.8 oz (89.7 kg)   Height: 5' 10\" (1.778 m)     Wt Readings from Last 3 Encounters:   10/05/20 197 lb 12.8 oz (89.7 kg)     Body mass index is 28.38 kg/m .    PHYSICAL EXAM:  General Appearance: Alert, cooperative, no distress, appears stated age  HEENT: Pupils are equal and reactive, extraocular motions is normal.  Oropharynx is moist.  Neck is supple no notable thyromegaly.  External ears are normal.  Examination of the temporomandibular joint was done, there was no clicks noted mild discomfort noted on the right side otherwise exam was normal.  There is no tenderness around the temple.  There is a mild muscle tension on the neck bilaterally.  Lungs: Clear to auscultation bilaterally, respirations unlabored  Heart: Regular rhythm and normal rate,S1 and S2 normal, no murmur, rub, or gallop  Abdomen: Soft, no masses no organs are palpable.  Musculoskeletal: Normal range of motion. No joint swelling or deformity.   Skin: Normal skin  turgor no skin lesions.  Neurologic:  Alert and oriented times 3. Normal reflexes. Cranial nerves II-XII intact.   Psychiatric: Normal mood and affect.    MEDICATIONS:  Current Outpatient Medications   Medication Sig Dispense Refill     gabapentin (NEURONTIN) 300 MG capsule Take 1 capsule (300 mg total) by mouth 2 (two) times a day as needed. 60 capsule 0     eletriptan (RELPAX) 20 MG tablet Take 1 tablet (20 mg total) by mouth as needed. may repeat in 2 hours if necessary 20 tablet 2     No current facility-administered medications for this visit.                  "

## 2021-06-18 ENCOUNTER — OFFICE VISIT - HEALTHEAST (OUTPATIENT)
Dept: PODIATRY | Facility: CLINIC | Age: 61
End: 2021-06-18

## 2021-06-18 DIAGNOSIS — M72.2 PLANTAR FASCIITIS: ICD-10-CM

## 2021-06-18 DIAGNOSIS — M24.573 EQUINUS CONTRACTURE OF ANKLE: ICD-10-CM

## 2021-06-26 NOTE — PROGRESS NOTES
FOOT AND ANKLE SURGERY/PODIATRY Progress Note        ASSESSMENT:   Plantar Fasciitis  Gastrosoleus Equinus       TREATMENT:  -Patient has pain along the plantar heel at insertion of plantar fascia consistent with plantar fasciitis. We discussed treatment options to include stretching exercises, anti-inflammatory medication, orthotics, steroid injections, physical therapy and a night splint.     -All questions invited and answered. I will start her on a 12 day tapered course of prednisone and have referred her to [facility] for custom orthotics. Also referred to physical therapy.     -I have asked her to follow-up after using the orthotics x3-4 weeks if symptoms continue.     Kar Sethi DPM  [facility] Podiatry/Foot & Ankle Surgery      HPI: I was asked to see Vanessa Santos today for bilateral foot pain. The patient complains of heel pain on both feet which has been present for several years. She admits using orthotics x10 years which are worn and in need or replacing. She has used ibuprofen almost daily with some stomach irritation.     History reviewed. No pertinent past medical history.    Past Surgical History:   Procedure Laterality Date     BREAST SURGERY      Lumpectomy for Hyperplasia     TONSILECTOMY, ADENOIDECTOMY, BILATERAL MYRINGOTOMY AND TUBES         Allergies   Allergen Reactions     Shellfish Containing Products Anaphylaxis         Current Outpatient Medications:      cyclobenzaprine (FLEXERIL) 10 MG tablet, Take 10 mg by mouth daily as needed. , Disp: , Rfl:      LORazepam (ATIVAN) 1 MG tablet, TAKE 1 TABLET 1 HOUR BEFORE MRI. MAY REPEAT ONCE 15 MINUTES BEFORE MRI., Disp: , Rfl:      rizatriptan (MAXALT) 10 MG tablet, Take 1 tab at onset of Headache,may repeat in 2 hours if needed. Max 30 mg /24 hours., Disp: 30 tablet, Rfl: 0     eletriptan (RELPAX) 20 MG tablet, Take 1 tablet (20 mg total) by mouth as needed. may repeat in 2 hours if necessary, Disp: 20 tablet, Rfl: 2     gabapentin  (NEURONTIN) 300 MG capsule, Take 1 capsule (300 mg total) by mouth 2 (two) times a day as needed., Disp: 60 capsule, Rfl: 0     hydrocortisone 2.5 % ointment, , Disp: , Rfl:      ketoconazole (NIZORAL) 2 % cream, , Disp: , Rfl:      meloxicam (MOBIC) 15 MG tablet, Take 15 mg by mouth., Disp: , Rfl:      predniSONE (DELTASONE) 10 mg tablet, 40mg x3 days, 30mg x3 days, 20mg x3 days, 10mg x3 days., Disp: 30 tablet, Rfl: 0     tiZANidine (ZANAFLEX) 2 MG tablet, TAKE 1 TO 2 TABLETS BY MOUTH AT BEDTIME. DO NOT DRIVE OR GO TO WORK WHILE TAKING THIS, Disp: , Rfl:     Family History   Problem Relation Age of Onset     Migraines Sister      Migraines Brother        Social History     Socioeconomic History     Marital status:      Spouse name: Not on file     Number of children: Not on file     Years of education: Not on file     Highest education level: Not on file   Occupational History     Not on file   Social Needs     Financial resource strain: Not on file     Food insecurity     Worry: Not on file     Inability: Not on file     Transportation needs     Medical: Not on file     Non-medical: Not on file   Tobacco Use     Smoking status: Former Smoker     Quit date: 10/5/2013     Years since quittin.7     Smokeless tobacco: Never Used   Substance and Sexual Activity     Alcohol use: Not on file     Comment: Not frequently     Drug use: Never     Sexual activity: Yes     Partners: Male   Lifestyle     Physical activity     Days per week: Not on file     Minutes per session: Not on file     Stress: Not on file   Relationships     Social connections     Talks on phone: Not on file     Gets together: Not on file     Attends Buddhism service: Not on file     Active member of club or organization: Not on file     Attends meetings of clubs or organizations: Not on file     Relationship status: Not on file     Intimate partner violence     Fear of current or ex partner: Not on file     Emotionally abused: Not on file      Physically abused: Not on file     Forced sexual activity: Not on file   Other Topics Concern     Not on file   Social History Narrative     Not on file       Review of Systems - 10 point Review of Systems is negative except for arch pain which is noted in HPI.    OBJECTIVE:  Appearance: alert, well appearing, and in no distress.    General appearance: Patient is alert and fully cooperative with history & exam.  No sign of distress is noted during the visit.     Psychiatric: Affect is pleasant & appropriate.  Patient appears motivated to improve health.     Respiratory: Breathing is regular & unlabored while sitting.     HEENT: Hearing is intact to spoken word.  Speech is clear.  No gross evidence of visual impairment that would impact ambulation.    Vascular: Dorsalis pedis and posterior tibial pulses are palpable. There is pedal hair growth bilateral.  CFT < 3 sec from anterior tibial surface to distal digits bilateral. There is no appreciable edema noted.  Dermatologic: Turgor and texture are within normal limits. No coloration or temperature changes. No primary or secondary lesions noted.  Neurologic: All epicritic and proprioceptive sensations are grossly intact bilateral.  Musculoskeletal: Mild pain along the plantar bilateral heels at the insertion of the plantar fascia. Limited ankle dorsiflexion with knee extended and flexed.     Imaging:     No results found.

## 2021-07-04 NOTE — PATIENT INSTRUCTIONS - HE
Patient Instructions by Sulma Govea RN at 6/18/2021  3:00 PM     Author: Sulma Govea RN Service: -- Author Type: Registered Nurse    Filed: 6/18/2021  3:24 PM Encounter Date: 6/18/2021 Status: Addendum    : Sulma Govea RN (Registered Nurse)    Related Notes: Original Note by Sulma Govea RN (Registered Nurse) filed at 6/18/2021  3:21 PM       Someone from physical therapy will call you to schedule.      Take oral prednisone as prescribed.    Get orthotics:  [facility] (Please call to make an appointment)    [facility] [place] [id number], [address]  [place], [place].  Phone: [phone]        What is Plantar Fasciitis?  Plantar Fasciitis also referred to as heel pain syndrome is the most common cause cited for pain in heels. Plantar Fasciitis is the pain and inflammation at the point where the flat band of tissue called the plantar fascia connects the heel bone to the toes. Plantar fascia maintains the arch of the foot. Applying pressure on it will make it swollen, weak, and irritated. This will make the heel of your foot to ache when you walk or stand for a prolonged period.    Symptoms  Most people suffering from Plantar Fasciitis experience pain when they walk after resting their feet for a long duration. You may experience less pain and stiffness after a few steps. But your foot may hurt more as the day goes on. It may hurt the most when you climb stairs or after you stand for a long time. Continuous foot pain at night might be due to different problems like arthritis or nerve problems.    Causes  Straining the ligament which supports the arch can cause Plantar Fasciitis. Repeated straining can cause tiny tears in the ligament which lead to pain and swelling. Plantar Fasciitis is very evident in cases of:  Tight Achilles tendon or calf muscles   Running long distances, especially on hard & uneven surfaces    Problems of the foot arch   Sudden obesity   Wearing shoes with soft soles or poor arch support   In-toeing    Treatment Options  Anti-Inflammatory Medication   - Ibuprofen 600 mg by mouth 3 times per day with food     (discontinue if stomach irritation occurs)    - Topical pain creams from [employer]     * apply 1-2 grams to painful areas 3 times per day prior to      Stretching    - Oral Prednisone  Orthotics (functional orthotics) Insurance Code:  (custome made, doctor prescribed)   Steroid injections    - Maximum of 3 injections in one year.   Night Splint   Physical therapy    - Stretching, Ultrasound, etc...

## 2021-07-06 VITALS — SYSTOLIC BLOOD PRESSURE: 106 MMHG | RESPIRATION RATE: 18 BRPM | HEART RATE: 80 BPM | DIASTOLIC BLOOD PRESSURE: 76 MMHG

## 2021-07-15 ENCOUNTER — HOSPITAL ENCOUNTER (OUTPATIENT)
Dept: PHYSICAL THERAPY | Facility: REHABILITATION | Age: 61
End: 2021-07-15
Payer: COMMERCIAL

## 2021-07-15 DIAGNOSIS — M72.2 PLANTAR FASCIITIS: Primary | ICD-10-CM

## 2021-07-15 PROCEDURE — 97140 MANUAL THERAPY 1/> REGIONS: CPT | Mod: GP | Performed by: PHYSICAL THERAPIST

## 2021-07-15 NOTE — PROGRESS NOTES
[facility] Outpatient Physical Therapy Daily Progress Note  Patient Name: Vanessa Santos  Date of evaluation: 7/5/2021  Today's Date: 7/15/2021   Visit Number: 2 of 12 per PT POC (no specific ins limit)  Referring provider: Dr. Sethi  Referral Diagnosis: Plantar Fasciitis  Visit Diagnosis:     ICD-10-CM    1. Plantar fasciitis  M72.2        Assessment:      Patient is working on home stretches and will be getting orthotics soon (had fitting for them last week).  Continues to benefit from further PT.     Goals:  Pt. will demonstrate/verbalize independence in self-management of condition: PROGRESSING TOWARD    Pt will: improve plantar fascia pain in bilateral feet to <3/10 in order to work a full shift at work: PROGRESSING TOWARD     Plan:      Plan for next visit: continue with manual therapy on bilat feet. Look at gait closer next session.  Continue with kinesiotape if it was helpful.  Continue to discuss shoewear (discussed that patient could trial danskos more often to offload the plantar fascia, but needs to keep stretching). Discussed that she could trial a night splint - see if she decided to get one.   Patient not taking her steroid pack yet, see if she decided to start this.    Patient is in agreement with PT plan of care and goals.        Subjective:       4/10 pain in heels    [place] the tape was helpful.     Is off work now for a few weeks.     Did get fitting for orthotics.    Functional limitations:   walking  Activities previously enjoyed, but limited: going for walks with spouse haven't been able to happen d/t pain      Objective:      Still noting myofascial restrictions in bilateral plantar fascia.       Treatment Today     TREATMENT MINUTES COMMENTS   Evaluation     Self-care/ Home management  Discussion of possibly trying Danskos more often to offload the stretch on the Achilles and plantar fascia.   Discussion of trying night splint  Ed on purpose of K tape.    Manual therapy 30 Patient  prone: bilateral gastroc/soleus, plantar fascia MFR. Manual stretching into dorsiflexion    K tape to prmote arch lift: double I strip from lateral mid toot pulling under foot to mid arch and up to dorsum of foot curving to go up the shin.    Neuromuscular Re-education     Therapeutic Activity     Therapeutic Exercises  [id number]   educated patient on hip ER strengthening and foot doming to help with how the foot hits the ground.   Gait training     Modality__________________                Total 30    Blank areas are intentional and mean the treatment did not include these items.     Mai Floyd, PT, CLT  7/15/2021

## 2021-07-21 ENCOUNTER — HOSPITAL ENCOUNTER (OUTPATIENT)
Dept: PHYSICAL THERAPY | Facility: REHABILITATION | Age: 61
End: 2021-07-21
Payer: COMMERCIAL

## 2021-07-21 DIAGNOSIS — M72.2 PLANTAR FASCIITIS: Primary | ICD-10-CM

## 2021-07-21 PROCEDURE — 97140 MANUAL THERAPY 1/> REGIONS: CPT | Mod: GP | Performed by: PHYSICAL THERAPIST

## 2021-07-21 NOTE — PROGRESS NOTES
"[facility] Outpatient Physical Therapy Daily Progress Note  Patient Name: Vanessa Santos  Date of evaluation: 7/5/2021  Today's Date: 7/21/2021   Visit Number: 3 of 12 per PT POC (no specific ins limit)  Referring provider: Dr. Sethi  Referral Diagnosis: Plantar Fasciitis  Visit Diagnosis:     ICD-10-CM    1. Plantar fasciitis  M72.2        Assessment:      Patient is starting to notice some slight improvements. She feels less pain, but the pain has not gone away at any time. She isn't sure if the manual therapy, the exercises, or just taking time off work is what is helping, but overall, less pain.   She will get her orthotics soon, but hasn't heard from orthotist about when they are done yet.    Goals:  Pt. will demonstrate/verbalize independence in self-management of condition: PROGRESSING TOWARD    Pt will: improve plantar fascia pain in bilateral feet to <3/10 in order to work a full shift at work: PROGRESSING TOWARD     Plan:      Plan for next visit: continue with manual therapy on bilat feet. Look at gait closer next session.  Continue with kinesiotape if it was helpful.    Patient is in agreement with PT plan of care and goals.        Subjective:       \"mild\" pain in heels. Tape seems helpful.     Functional limitations:   walking  Activities previously enjoyed, but limited: going for walks with spouse haven't been able to happen d/t pain      Objective:      Still noting myofascial restrictions in bilateral plantar fascia.       Treatment Today     TREATMENT MINUTES COMMENTS   Evaluation     Self-care/ Home management  Discussion of possibly trying Dans[employer] more often to offload the stretch on the Achilles and plantar fascia.   Discussion of trying night splint  Ed on purpose of K tape.    Manual therapy 40 Patient prone: bilateral gastroc/soleus, plantar fascia MFR. Manual stretching into dorsiflexion    K tape to prmote arch lift: double I strip from lateral mid toot pulling under foot to mid arch " and up to dorsum of foot curving to go up the shin.    Neuromuscular Re-education     Therapeutic Activity     Therapeutic Exercises  [id number]   educated patient on hip ER strengthening and foot doming to help with how the foot hits the ground.   Gait training     Modality__________________                Total 40 Was able to see patient longer today.   Blank areas are intentional and mean the treatment did not include these items.     Mai Floyd, PT, CLT  7/21/2021

## 2021-07-26 ENCOUNTER — HOSPITAL ENCOUNTER (OUTPATIENT)
Dept: PHYSICAL THERAPY | Facility: REHABILITATION | Age: 61
End: 2021-07-26
Payer: COMMERCIAL

## 2021-07-26 DIAGNOSIS — M72.2 PLANTAR FASCIITIS: Primary | ICD-10-CM

## 2021-07-26 PROCEDURE — 97140 MANUAL THERAPY 1/> REGIONS: CPT | Mod: GP | Performed by: PHYSICAL THERAPIST

## 2021-07-26 NOTE — PROGRESS NOTES
[facility] Outpatient Physical Therapy Daily Progress Note  Patient Name: Vanessa Santos  Date of evaluation: 7/5/2021  Today's Date: 7/26/2021    Visit Number: 4 of 12 per PT POC (no specific ins limit)  Referring provider: Dr. Sethi  Referral Diagnosis: Plantar Fasciitis  Visit Diagnosis:     ICD-10-CM    1. Plantar fasciitis  M72.2        Assessment:      Patient is having less pain in left foot especially. She feels the k tape is helping her feet pain. She has not received her orthotics yet, she thinks this week they may be in.   Patient continues to benefit from further PT.     Goals:  Pt. will demonstrate/verbalize independence in self-management of condition: PROGRESSING TOWARD    Pt will: improve plantar fascia pain in bilateral feet to <3/10 in order to work a full shift at work: PROGRESSING TOWARD     Plan:      Plan for next visit: continue with manual therapy on bilat feet.   Continue with kinesiotape if it was helpful.    Patient is in agreement with PT plan of care and goals.        Subjective:       Left foot feeling better.   Does work today, 5.5 hour shift. Will be a good test of her symptoms.     Functional limitations:   walking  Activities previously enjoyed, but limited: going for walks with spouse haven't been able to happen d/t pain      Objective:      Gait: noticeable overpronation bilaterally.     Still noting myofascial restrictions in bilateral plantar fascia.       Treatment Today     TREATMENT MINUTES COMMENTS   Evaluation     Self-care/ Home management  Discussion of possibly trying [employer] more often to offload the stretch on the Achilles and plantar fascia.   Discussion of trying night splint  Ed on purpose of K tape.    Manual therapy 29 Patient prone: bilateral gastroc/soleus, plantar fascia MFR. Manual stretching into dorsiflexion    K tape to prmote arch lift: double I strip from lateral mid toot pulling under foot to mid arch and up to dorsum of foot curving to go up the  shin.    Neuromuscular Re-education     Therapeutic Activity     Therapeutic Exercises  [id number]   educated patient on hip ER strengthening and foot doming to help with how the foot hits the ground.   Gait training     Modality__________________                Total 29    Blank areas are intentional and mean the treatment did not include these items.     Mai Floyd, PT, CLT

## 2021-08-04 ENCOUNTER — HOSPITAL ENCOUNTER (OUTPATIENT)
Dept: PHYSICAL THERAPY | Facility: REHABILITATION | Age: 61
End: 2021-08-04
Payer: COMMERCIAL

## 2021-08-04 DIAGNOSIS — M72.2 PLANTAR FASCIITIS: Primary | ICD-10-CM

## 2021-08-04 PROCEDURE — 97140 MANUAL THERAPY 1/> REGIONS: CPT | Mod: GP | Performed by: PHYSICAL THERAPIST

## 2021-08-04 NOTE — PROGRESS NOTES
[facility] Outpatient Physical Therapy Daily Progress Note  Patient Name: Vanessa Santos  Date of evaluation: 7/5/2021  Today's Date: 8/4/2021    Visit Number: 5 of 12 per PT POC (no specific ins limit)  Referring provider: Dr. Sethi  Referral Diagnosis: Plantar Fasciitis  Visit Diagnosis:     ICD-10-CM    1. Plantar fasciitis  M72.2        Assessment:      Patient does feel improvement on a whole with regard to her foot pain, though when she overdoes it, her right foot especially will have more pain. She is noticing that she recovers quickly after pain flares.   Patient still waiting on orthotics and is considering night splint.   She will continue to work on home exercises and follow up in 3-4 weeks again.     Goals:  Pt. will demonstrate/verbalize independence in self-management of condition: PROGRESSING TOWARD    Pt will: improve plantar fascia pain in bilateral feet to <3/10 in order to work a full shift at work:NOT MET, and not returning to work.     Plan:      Plan for next visit: continue with manual therapy on bilat feet. Continue with kinesiotape if it was helpful  Patient will return in 3-4 weeks    Patient is in agreement with PT plan of care and goals.      Subjective:       Did not feel good after her last shift she had. She states she is not going to return to that job anyway. She had a flare with helping her son move last weekend, but she recovers by the next day. She feels this is progress.    Functional limitations:   walking  Activities previously enjoyed, but limited: going for walks with spouse haven't been able to happen d/t pain      Objective:      Gait: noticeable overpronation bilaterally.     Still noting myofascial restrictions in bilateral plantar fascia, but less apparent.     No orthotics yet.      Treatment Today     TREATMENT MINUTES COMMENTS   Evaluation     Self-care/ Home management  Discussion of possibly trying Dans[employer] more often to offload the stretch on the Achilles and  plantar fascia.   Discussion of trying night splint  Ed on purpose of K tape.    Manual therapy 29 Patient prone: bilateral gastroc/soleus, plantar fascia MFR. Manual stretching into dorsiflexion    K tape to prmote arch lift: double I strip from lateral mid toot pulling under foot to mid arch and up to dorsum of foot curving to go up the shin.    Neuromuscular Re-education     Therapeutic Activity     Therapeutic Exercises  [id number]   educated patient on hip ER strengthening and foot doming to help with how the foot hits the ground.   Gait training     Modality__________________                Total 29    Blank areas are intentional and mean the treatment did not include these items.     Mai Floyd, PT, CLT

## 2021-08-25 ENCOUNTER — HOSPITAL ENCOUNTER (OUTPATIENT)
Dept: PHYSICAL THERAPY | Facility: REHABILITATION | Age: 61
End: 2021-08-25
Payer: COMMERCIAL

## 2021-08-25 DIAGNOSIS — M72.2 PLANTAR FASCIITIS: Primary | ICD-10-CM

## 2021-08-25 PROCEDURE — 97140 MANUAL THERAPY 1/> REGIONS: CPT | Mod: GP | Performed by: PHYSICAL THERAPIST

## 2021-08-25 PROCEDURE — 97535 SELF CARE MNGMENT TRAINING: CPT | Mod: GP | Performed by: PHYSICAL THERAPIST

## 2021-08-25 NOTE — PROGRESS NOTES
[facility] Outpatient Physical Therapy Discharge Summary  Patient Name: Vanessa Santos  Date of evaluation: 7/5/2021  Today's Date: 8/4/2021    Visit Number: 6 of 12 per PT POC (no specific ins limit)  Referring provider: Dr. Sethi  Referral Diagnosis: Plantar Fasciitis  Visit Diagnosis:     ICD-10-CM    1. Plantar fasciitis  M72.2            Assessment:      Patient is feeling good with regard to her pain. She has orthotics, and took steroid, since then her pain has been significantly reduced. We discussed home management plan today. No further PT indicated at this time. Discharge episode of care.    Goals:  Pt. will demonstrate/verbalize independence in self-management of condition: MET    Pt will: improve plantar fascia pain in bilateral feet to <3/10 in order to work a full shift at work: NOT Necessary (not returning to work), however, patient has been able to walk 3 miles without pain.      Plan:      Discharge PT    Subjective:       Doing well. Likes orthotics. Went on a trip and walked 3 miles every day without issues.    Objective:      Gait: noticeable overpronation bilaterally, much improved with orthotics in shoes today.     Less plantar fascia restrictions noted upon palpation, not tender to patient.         Treatment Today     TREATMENT MINUTES COMMENTS   Evaluation     Self-care/ Home management 10 revieqwed her home program with strengthening intrinsics and stretching bottom of foot.   Manual therapy 20 Patient prone: bilateral gastroc/soleus, plantar fascia MFR. Manual stretching into dorsiflexion   Neuromuscular Re-education     Therapeutic Activity     Therapeutic Exercises  [id number]   educated patient on hip ER strengthening and foot doming to help with how the foot hits the ground.   Gait training     Modality__________________                Total 30    Blank areas are intentional and mean the treatment did not include these items.     Mai Floyd, PT, CLT

## 2021-10-11 ENCOUNTER — HEALTH MAINTENANCE LETTER (OUTPATIENT)
Age: 61
End: 2021-10-11

## 2021-12-01 DIAGNOSIS — G43.009 NONINTRACTABLE MIGRAINE, UNSPECIFIED MIGRAINE TYPE: ICD-10-CM

## 2021-12-01 RX ORDER — RIZATRIPTAN BENZOATE 10 MG/1
10 TABLET ORAL
Qty: 18 TABLET | Refills: 0 | Status: SHIPPED | OUTPATIENT
Start: 2021-12-01 | End: 2022-08-31

## 2021-12-01 NOTE — TELEPHONE ENCOUNTER
Refill request for rizatriptan.  Previous pt of Dr. Villalpando. Last seen 1/27/21.  A message has been left for pt to call and schedule with another provider as Dr. Villalpando is no longer here.  Medication T'd for review and signature    Arleth Harrison LPN on 12/1/2021 at 1:14 PM

## 2021-12-01 NOTE — TELEPHONE ENCOUNTER
Melonie sent a refill request for Rizatriptan 10 mg 1 tab PRN. I have LM fore pt to call and make a appt.

## 2022-04-19 ENCOUNTER — HOSPITAL ENCOUNTER (OUTPATIENT)
Dept: MAMMOGRAPHY | Facility: CLINIC | Age: 62
Discharge: HOME | End: 2022-04-19
Attending: FAMILY MEDICINE | Admitting: FAMILY MEDICINE
Payer: COMMERCIAL

## 2022-04-19 DIAGNOSIS — Z12.31 VISIT FOR SCREENING MAMMOGRAM: ICD-10-CM

## 2022-04-19 PROCEDURE — 77067 SCR MAMMO BI INCL CAD: CPT

## 2022-08-31 ENCOUNTER — OFFICE VISIT (OUTPATIENT)
Dept: NEUROLOGY | Facility: CLINIC | Age: 62
End: 2022-08-31
Payer: COMMERCIAL

## 2022-08-31 VITALS
BODY MASS INDEX: 25.97 KG/M2 | SYSTOLIC BLOOD PRESSURE: 115 MMHG | HEIGHT: 70 IN | WEIGHT: 181.4 LBS | HEART RATE: 70 BPM | DIASTOLIC BLOOD PRESSURE: 73 MMHG

## 2022-08-31 DIAGNOSIS — Z79.899 ENCOUNTER FOR LONG-TERM (CURRENT) USE OF MEDICATIONS: ICD-10-CM

## 2022-08-31 DIAGNOSIS — Z82.49 FAMILY HISTORY OF VASCULAR DISEASE: ICD-10-CM

## 2022-08-31 DIAGNOSIS — G43.009 MIGRAINE WITHOUT AURA AND WITHOUT STATUS MIGRAINOSUS, NOT INTRACTABLE: Primary | ICD-10-CM

## 2022-08-31 DIAGNOSIS — G43.009 NONINTRACTABLE MIGRAINE, UNSPECIFIED MIGRAINE TYPE: ICD-10-CM

## 2022-08-31 PROCEDURE — 99215 OFFICE O/P EST HI 40 MIN: CPT | Performed by: PSYCHIATRY & NEUROLOGY

## 2022-08-31 RX ORDER — RIZATRIPTAN BENZOATE 10 MG/1
10 TABLET ORAL
Qty: 18 TABLET | Refills: 5 | Status: SHIPPED | OUTPATIENT
Start: 2022-08-31 | End: 2023-03-02

## 2022-08-31 NOTE — LETTER
8/31/2022         RE: Vanessa Santos  [address] [address]        Dear Colleague,    Thank you for referring your patient, Vanessa Santos, to the [facility] [place]. Please see a copy of my visit note below.    INITIAL NEUROLOGY CONSULTATION - Transfer of Care    DATE OF VISIT: 8/31/2022  MRN: [id number]  PATIENT NAME: Vanessa Santso  YOB: 1960    REFERRING PROVIDER: No ref. provider found    Chief Complaint   Patient presents with     Headache     Transfer care Dr. Villalpando- facial pain       SUBJECTIVE:                                                      HPI:   Vanessa Santos is a 62 year old female here to establish care for facial pain/headaches.  Per chart review, the patient previously followed with Dr. Villalpando, most recently seen in 1.2021.  Plan at that time was to do alternating rizatriptan and Fioricet as needed and an occipital nerve block on the right.  Over-the-counter medications recommended as well, alternating the rizatriptan with ibuprofen + Tylenol.  She was complaining of right arm this initially but this had improved.  She described posterior right-sided headache spreading forward to the temporal area.  EMG of the right arm was normal.  Brain MRI showed some age-related changes.  Multilevel degenerative changes on cervical MRI.    Vanessa is here alone today.  She says her migraines have changed some in distribution and that the used to always be behind the right eye but now the pain is more prominently in the temple.  Recent ESR was normal.  She says the pain does still at times migrated from the right occiput to the front.  She does endorse some occipital tenderness when she is in pain.  She does not think the occipital nerve block did anything in terms of decreasing her migraines.  She recalls that around that time when she last saw Dr. Villalpando she was having more frequent headaches.  She did have 2 episodes recently of visual  aura, not followed by headache.  The first was right after her COVID-vaccine, the other 6 months later.  No aura or visual changes with her typical migraines.    Triggers: Dehydration, alcohol.    More recently she has developed some facial pain on the Right as well. She saw a head and neck specialist pain specialist and will be doing physical therapy for TMJ dysfunction.  She does endorse some neck pain with her headaches.  This has improved from previous, when she was also having that arm numbness which remains resolved.    Her headaches have been less frequent over the past 6 months. She takes the rizatriptan several times per month. A bad month would be 10 headaches.  She is better about taking the rizatriptan right away rather than waiting.  It makes her little sleepy but otherwise she tolerates it well and it is almost always effective.  Rarely she takes a second dose.  Typically she gets her migraines in the morning. Without treatment: 2-3 days.  She denies nausea, dizziness, weakness or sensory changes with her headaches.  She does endorse light and sound sensitivity.    She says that she has tried motrin without effect.    She was also Flexeril for her jaw pain which does help.   She says that the gabapentin was started when she was having the increased neck pain and numbness.    Vanessa's mother had migraines.  She also had cerebrovascular disease, significant and untreated, noted on her death certificate.  Vanessa wonders about her own vessel health.      Past Medical History:   Diagnosis Date     Migraines      Past Surgical History:   Procedure Laterality Date     BREAST SURGERY Left 04/15/2015    Lumpectomy for Hyperplasia     TONSILLECTOMY, ADENOIDECTOMY, MYRINGOTOMY, INSERT TUBE BILATERAL, COMBINED         cyclobenzaprine (FLEXERIL) 10 MG tablet, Take 10 mg by mouth nightly as needed  gabapentin (NEURONTIN) 300 MG capsule, Take 300 mg by mouth At Bedtime (Patient not taking: Reported on 8/31/2022)    No  "current facility-administered medications on file prior to visit.    Allergies   Allergen Reactions     Shellfish Allergy Anaphylaxis        Problem (# of Occurrences) Relation (Name,Age of Onset)    Breast Cancer (2) Maternal Aunt (50), Maternal Aunt (50)    Diabetes (2) Mother, Father    Heart Failure (2) Mother, Father    Migraines (2) Sister, Brother        Social History     Tobacco Use     Smoking status: Former Smoker     Packs/day: 0.50     Years: 22.00     Pack years: 11.00     Types: Cigarettes     Quit date: 10/5/2013     Years since quittin.9     Smokeless tobacco: Never Used     Tobacco comment: quit in    Substance Use Topics     Alcohol use: Yes     Comment: Alcoholic Drinks/day: 2 drinks a week     Drug use: Never       REVIEW OF SYSTEMS:                                                      10-point review of systems is negative except as mentioned above in HPI.     EXAM:                                                      Physical Exam:   Vitals: /73 (BP Location: Right arm, Patient Position: Sitting)   Pulse 70   Ht 1.778 m (5' 10\")   Wt 82.3 kg (181 lb 6.4 oz)   LMP  (LMP Unknown)   BMI 26.03 kg/m    BMI= Body mass index is 26.03 kg/m .  GENERAL: NAD.  HEENT: NC/AT. No TTP of head or neck.   CV: RRR. S1, S2.   NECK: No bruits.  PULM: Non-labored breathing.   Neurologic:  MENTAL STATUS: Alert, attentive. Speech is fluent. Normal comprehension. Normal concentration. Adequate fund of knowledge.   CRANIAL NERVES: Discs flat. Visual fields intact to confrontation. Pupils equally, round and reactive to light. Facial sensation and movement normal. EOM full. Hearing intact to conversation. Trapezius strength intact. Palate moves symmetrically. Tongue midline.  MOTOR: 5/5 in proximal and distal muscle groups of upper and lower extremities. Tone and bulk normal.   DTRs: Intact and symmetric in biceps, BR, patellae.  Babinski down-going bilaterally.   SENSATION: Normal light touch and " pinprick. Vibration: Slightly decreased at both ankles.   COORDINATION: Normal finger nose finger.   STATION AND GAIT: Casual gait is normal.  Right hand-dominant.    Relevant Data:  MRI Brain and Cervical Spine (1.4.21):  IMPRESSION:  HEAD MRI:  1.  No acute/subacute infarction, intracranial hemorrhage, mass effect, or hydrocephalus.  2.  Presumed sequelae of mild chronic small vessel ischemic disease.     CERVICAL SPINE MRI:  1.  Multilevel degenerative changes of the cervical spine as described in detail above. When compared with 02/03/2020, there has been mild interval progression of degenerative changes, though appearance may be mildly accentuated by neck extension.  2.  At C5-C6, there is moderate spinal canal stenosis with mild flattening of the spinal cord contour and severe bilateral neuroforaminal stenosis.  3.  At C6-C7, there is mild spinal canal stenosis and severe right and mild left neuroforaminal stenosis.  4.  At C4-C5, there is moderate spinal canal stenosis and mild to moderate right and moderate left neuroforaminal stenosis.    Nerve conduction studies/EMG (1.27.22):  Findings:  Motor nerve conduction studies of the right median and right ulnar nerves are well within normal limits.  F-wave latency in the right ulnar nerve is normal.     Sensory studies of the right median and ulnar nerves are normal with robust amplitudes.     Needle EMG of selected muscles throughout the right arm including shoulder girdle muscles showed no abnormal spontaneous activity and motor units of normal configuration and recruitment.     Conclusion:  This is a normal EMG of the right arm.    Imaging reviewed independently by me.  Agree with radiology read    ASSESSMENT and PLAN:                                                      Assessment:     ICD-10-CM    1. Migraine without aura and without status migrainosus, not intractable  G43.009 CTA Head Neck with Contrast     Pain Management  Referral   2.  Nonintractable migraine, unspecified migraine type  G43.009 rizatriptan (MAXALT) 10 MG tablet     CTA Head Neck with Contrast     Pain Management  Referral   3. Encounter for long-term (current) use of medications  Z79.899    4. Family history of vascular disease  Z82.49         Ms. Santos is a pleasant 62-year-old woman here to establish care for migraine headaches.  She has additional pain issues with neck muscle tension and TMJ dysfunction.  I agree with her plan for treatment with PT for the latter.  Her migraines are currently under good control, with as needed treatment.  We did talk about acupuncture as an option for prevention going forward.  Because of her family history I think it would be reasonable to do imaging of the head and neck vessels.  We will plan to follow-up again in 6 months.  Vanessa agrees and will let me know if anything changes in the meantime.    Plan:  -- Continue the rizatriptan as needed for migraine symptoms.  -- Vessel imaging: CTA of the head and neck.  We will notify you of the results.  -- Consider acupuncture treatments for headache prevention.  Order is in.  -- PT as planned for the jaw/facial pain.  -- Return to neurology clinic in 6 months.  Please let us know if any concerns arise in the meantime.    Total Time: 40 minutes were spent with the patient and in chart review/documentation (as described above in Assessment and Plan) /coordinating the care on date of service.    Jocelyn Puga MD  Neurology    CC: Faiza Dumont MD and Marilou Mathews MD    Dragon software used in the dictation of this note.        Again, thank you for allowing me to participate in the care of your patient.        Sincerely,        Jocelyn Puga MD

## 2022-08-31 NOTE — NURSING NOTE
Chief Complaint   Patient presents with     Headache     Transfer care Dr. Villalpando- facial pain     Marlo Johnson CMA@ on 8/31/2022 at 12:55 PM

## 2022-08-31 NOTE — PROGRESS NOTES
INITIAL NEUROLOGY CONSULTATION - Transfer of Care    DATE OF VISIT: 8/31/2022  MRN: [id number]  PATIENT NAME: Vanessa Santos  YOB: 1960    REFERRING PROVIDER: No ref. provider found    Chief Complaint   Patient presents with     Headache     Transfer care Dr. Villalpando- facial pain       SUBJECTIVE:                                                      HPI:   Vanessa Santos is a 62 year old female here to establish care for facial pain/headaches.  Per chart review, the patient previously followed with Dr. Villalpando, most recently seen in 1.2021.  Plan at that time was to do alternating rizatriptan and Fioricet as needed and an occipital nerve block on the right.  Over-the-counter medications recommended as well, alternating the rizatriptan with ibuprofen + Tylenol.  She was complaining of right arm this initially but this had improved.  She described posterior right-sided headache spreading forward to the temporal area.  EMG of the right arm was normal.  Brain MRI showed some age-related changes.  Multilevel degenerative changes on cervical MRI.    Vanessa is here alone today.  She says her migraines have changed some in distribution and that the used to always be behind the right eye but now the pain is more prominently in the temple.  Recent ESR was normal.  She says the pain does still at times migrated from the right occiput to the front.  She does endorse some occipital tenderness when she is in pain.  She does not think the occipital nerve block did anything in terms of decreasing her migraines.  She recalls that around that time when she last saw Dr. Villalpando she was having more frequent headaches.  She did have 2 episodes recently of visual aura, not followed by headache.  The first was right after her COVID-vaccine, the other 6 months later.  No aura or visual changes with her typical migraines.    Triggers: Dehydration, alcohol.    More recently she has developed some facial pain on the Right as  well. She saw a head and neck specialist pain specialist and will be doing physical therapy for TMJ dysfunction.  She does endorse some neck pain with her headaches.  This has improved from previous, when she was also having that arm numbness which remains resolved.    Her headaches have been less frequent over the past 6 months. She takes the rizatriptan several times per month. A bad month would be 10 headaches.  She is better about taking the rizatriptan right away rather than waiting.  It makes her little sleepy but otherwise she tolerates it well and it is almost always effective.  Rarely she takes a second dose.  Typically she gets her migraines in the morning. Without treatment: 2-3 days.  She denies nausea, dizziness, weakness or sensory changes with her headaches.  She does endorse light and sound sensitivity.    She says that she has tried motrin without effect.    She was also Flexeril for her jaw pain which does help.   She says that the gabapentin was started when she was having the increased neck pain and numbness.    Vanessa's mother had migraines.  She also had cerebrovascular disease, significant and untreated, noted on her death certificate.  Vanessa wonders about her own vessel health.      Past Medical History:   Diagnosis Date     Migraines      Past Surgical History:   Procedure Laterality Date     BREAST SURGERY Left 04/15/2015    Lumpectomy for Hyperplasia     TONSILLECTOMY, ADENOIDECTOMY, MYRINGOTOMY, INSERT TUBE BILATERAL, COMBINED         cyclobenzaprine (FLEXERIL) 10 MG tablet, Take 10 mg by mouth nightly as needed  gabapentin (NEURONTIN) 300 MG capsule, Take 300 mg by mouth At Bedtime (Patient not taking: Reported on 8/31/2022)    No current facility-administered medications on file prior to visit.    Allergies   Allergen Reactions     Shellfish Allergy Anaphylaxis        Problem (# of Occurrences) Relation (Name,Age of Onset)    Breast Cancer (2) Maternal Aunt (50), Maternal Aunt (50)     "Diabetes (2) Mother, Father    Heart Failure (2) Mother, Father    Migraines (2) Sister, Brother        Social History     Tobacco Use     Smoking status: Former Smoker     Packs/day: 0.50     Years: 22.00     Pack years: 11.00     Types: Cigarettes     Quit date: 10/5/2013     Years since quittin.9     Smokeless tobacco: Never Used     Tobacco comment: quit in    Substance Use Topics     Alcohol use: Yes     Comment: Alcoholic Drinks/day: 2 drinks a week     Drug use: Never       REVIEW OF SYSTEMS:                                                      10-point review of systems is negative except as mentioned above in HPI.     EXAM:                                                      Physical Exam:   Vitals: /73 (BP Location: Right arm, Patient Position: Sitting)   Pulse 70   Ht 1.778 m (5' 10\")   Wt 82.3 kg (181 lb 6.4 oz)   LMP  (LMP Unknown)   BMI 26.03 kg/m    BMI= Body mass index is 26.03 kg/m .  GENERAL: NAD.  HEENT: NC/AT. No TTP of head or neck.   CV: RRR. S1, S2.   NECK: No bruits.  PULM: Non-labored breathing.   Neurologic:  MENTAL STATUS: Alert, attentive. Speech is fluent. Normal comprehension. Normal concentration. Adequate fund of knowledge.   CRANIAL NERVES: Discs flat. Visual fields intact to confrontation. Pupils equally, round and reactive to light. Facial sensation and movement normal. EOM full. Hearing intact to conversation. Trapezius strength intact. Palate moves symmetrically. Tongue midline.  MOTOR: 5/5 in proximal and distal muscle groups of upper and lower extremities. Tone and bulk normal.   DTRs: Intact and symmetric in biceps, BR, patellae.  Babinski down-going bilaterally.   SENSATION: Normal light touch and pinprick. Vibration: Slightly decreased at both ankles.   COORDINATION: Normal finger nose finger.   STATION AND GAIT: Casual gait is normal.  Right hand-dominant.    Relevant Data:  MRI Brain and Cervical Spine (21):  IMPRESSION:  HEAD MRI:  1.  No " acute/subacute infarction, intracranial hemorrhage, mass effect, or hydrocephalus.  2.  Presumed sequelae of mild chronic small vessel ischemic disease.     CERVICAL SPINE MRI:  1.  Multilevel degenerative changes of the cervical spine as described in detail above. When compared with 02/03/2020, there has been mild interval progression of degenerative changes, though appearance may be mildly accentuated by neck extension.  2.  At C5-C6, there is moderate spinal canal stenosis with mild flattening of the spinal cord contour and severe bilateral neuroforaminal stenosis.  3.  At C6-C7, there is mild spinal canal stenosis and severe right and mild left neuroforaminal stenosis.  4.  At C4-C5, there is moderate spinal canal stenosis and mild to moderate right and moderate left neuroforaminal stenosis.    Nerve conduction studies/EMG (1.27.22):  Findings:  Motor nerve conduction studies of the right median and right ulnar nerves are well within normal limits.  F-wave latency in the right ulnar nerve is normal.     Sensory studies of the right median and ulnar nerves are normal with robust amplitudes.     Needle EMG of selected muscles throughout the right arm including shoulder girdle muscles showed no abnormal spontaneous activity and motor units of normal configuration and recruitment.     Conclusion:  This is a normal EMG of the right arm.    Imaging reviewed independently by me.  Agree with radiology read    ASSESSMENT and PLAN:                                                      Assessment:     ICD-10-CM    1. Migraine without aura and without status migrainosus, not intractable  G43.009 CTA Head Neck with Contrast     Pain Management  Referral   2. Nonintractable migraine, unspecified migraine type  G43.009 rizatriptan (MAXALT) 10 MG tablet     CTA Head Neck with Contrast     Pain Management  Referral   3. Encounter for long-term (current) use of medications  Z79.899    4. Family history of  vascular disease  Z82.49         Ms. Santos is a pleasant 62-year-old woman here to establish care for migraine headaches.  She has additional pain issues with neck muscle tension and TMJ dysfunction.  I agree with her plan for treatment with PT for the latter.  Her migraines are currently under good control, with as needed treatment.  We did talk about acupuncture as an option for prevention going forward.  Because of her family history I think it would be reasonable to do imaging of the head and neck vessels.  We will plan to follow-up again in 6 months.  Vanessa agrees and will let me know if anything changes in the meantime.    Plan:  -- Continue the rizatriptan as needed for migraine symptoms.  -- Vessel imaging: CTA of the head and neck.  We will notify you of the results.  -- Consider acupuncture treatments for headache prevention.  Order is in.  -- PT as planned for the jaw/facial pain.  -- Return to neurology clinic in 6 months.  Please let us know if any concerns arise in the meantime.    Total Time: 40 minutes were spent with the patient and in chart review/documentation (as described above in Assessment and Plan) /coordinating the care on date of service.    Jocelyn Puga MD  Neurology    CC: Faiza Dumont MD and Marilou Mathews MD    Dragon software used in the dictation of this note.

## 2022-08-31 NOTE — PATIENT INSTRUCTIONS
Plan:  -- Continue the rizatriptan as needed for migraine symptoms.  -- Vessel imaging: CTA of the head and neck.  We will notify you of the results.  -- Consider acupuncture treatments for headache prevention.  Order is in.  -- PT as planned for the jaw/facial pain.  -- Return to neurology clinic in 6 months.  Please let us know if any concerns arise in the meantime.

## 2022-09-15 ENCOUNTER — HOSPITAL ENCOUNTER (OUTPATIENT)
Dept: CT IMAGING | Facility: CLINIC | Age: 62
Discharge: HOME | End: 2022-09-15
Attending: PSYCHIATRY & NEUROLOGY | Admitting: PSYCHIATRY & NEUROLOGY
Payer: COMMERCIAL

## 2022-09-15 DIAGNOSIS — G43.009 MIGRAINE WITHOUT AURA AND WITHOUT STATUS MIGRAINOSUS, NOT INTRACTABLE: ICD-10-CM

## 2022-09-15 DIAGNOSIS — G43.009 NONINTRACTABLE MIGRAINE, UNSPECIFIED MIGRAINE TYPE: ICD-10-CM

## 2022-09-15 PROCEDURE — 250N000011 HC RX IP 250 OP 636: Performed by: PSYCHIATRY & NEUROLOGY

## 2022-09-15 PROCEDURE — 70496 CT ANGIOGRAPHY HEAD: CPT

## 2022-09-15 PROCEDURE — 70498 CT ANGIOGRAPHY NECK: CPT

## 2022-09-15 RX ORDER — IOPAMIDOL 755 MG/ML
100 INJECTION, SOLUTION INTRAVASCULAR ONCE
Status: COMPLETED | OUTPATIENT
Start: 2022-09-15 | End: 2022-09-15

## 2022-09-15 RX ADMIN — IOPAMIDOL 100 ML: 755 INJECTION, SOLUTION INTRAVENOUS at 16:07

## 2022-09-16 NOTE — RESULT ENCOUNTER NOTE
Please let Vanessa know that her head CT and vessel imaging results are all normal.  No aneurysms nor evidence of vessel disease.    Thank you,  Dr. Puga

## 2022-09-24 ENCOUNTER — HEALTH MAINTENANCE LETTER (OUTPATIENT)
Age: 62
End: 2022-09-24

## 2023-03-02 ENCOUNTER — OFFICE VISIT (OUTPATIENT)
Dept: NEUROLOGY | Facility: CLINIC | Age: 63
End: 2023-03-02
Payer: COMMERCIAL

## 2023-03-02 VITALS
HEART RATE: 74 BPM | BODY MASS INDEX: 25.83 KG/M2 | SYSTOLIC BLOOD PRESSURE: 95 MMHG | WEIGHT: 180 LBS | DIASTOLIC BLOOD PRESSURE: 61 MMHG

## 2023-03-02 DIAGNOSIS — G50.1 ATYPICAL FACIAL PAIN: ICD-10-CM

## 2023-03-02 DIAGNOSIS — Z79.899 ENCOUNTER FOR LONG-TERM (CURRENT) USE OF MEDICATIONS: ICD-10-CM

## 2023-03-02 DIAGNOSIS — G43.009 NONINTRACTABLE MIGRAINE, UNSPECIFIED MIGRAINE TYPE: Primary | ICD-10-CM

## 2023-03-02 PROCEDURE — 99214 OFFICE O/P EST MOD 30 MIN: CPT | Performed by: PSYCHIATRY & NEUROLOGY

## 2023-03-02 RX ORDER — RIZATRIPTAN BENZOATE 10 MG/1
10 TABLET ORAL
Qty: 18 TABLET | Refills: 5 | Status: SHIPPED | OUTPATIENT
Start: 2023-03-02 | End: 2023-08-25

## 2023-03-02 RX ORDER — CYCLOBENZAPRINE HCL 10 MG
20 TABLET ORAL
Qty: 60 TABLET | Refills: 2 | Status: SHIPPED | OUTPATIENT
Start: 2023-03-02 | End: 2023-09-12

## 2023-03-02 NOTE — PATIENT INSTRUCTIONS
Plan:  -- Continue the rizatriptan as needed for migraine symptoms.  -- Check around town to see if you can find an affordable acupuncturist.  -- I agree with your plan to follow-up with regards to the Botox treatments for the facial pain.    -- In the meantime it would certainly be reasonable for you to try taking 20mg of the cyclobenzaprine in the evening to see if your pain is less prominent in the morning.  -- Return to neurology clinic in 6 months.

## 2023-03-02 NOTE — PROGRESS NOTES
ESTABLISHED PATIENT NEUROLOGY NOTE    DATE OF VISIT: 3/2/2023  MRN: [id number]  PATIENT NAME: Vanessa Santos  YOB: 1960    Chief Complaint   Patient presents with     Headache     6 mo follow-up        SUBJECTIVE:                                                      HISTORY OF PRESENT ILLNESS:  Vanessa is here for follow up regarding facial pain and headaches.  I met the patient for a transfer of care about 6 months ago.    History as previously documented by me (8.31.22):  Per chart review, the patient previously followed with Dr. Villalpando, most recently seen in 1.2021.  Plan at that time was to do alternating rizatriptan and Fioricet as needed and an occipital nerve block on the right.  Over-the-counter medications recommended as well, alternating the rizatriptan with ibuprofen + Tylenol.  She was complaining of right arm this initially but this had improved.  She described posterior right-sided headache spreading forward to the temporal area.  EMG of the right arm was normal.  Brain MRI showed some age-related changes.  Multilevel degenerative changes on cervical MRI.     Vanessa is here alone today.  She says her migraines have changed some in distribution and that the used to always be behind the right eye but now the pain is more prominently in the temple.  Recent ESR was normal.  She says the pain does still at times migrated from the right occiput to the front.  She does endorse some occipital tenderness when she is in pain.  She does not think the occipital nerve block did anything in terms of decreasing her migraines.  She recalls that around that time when she last saw Dr. Villalpando she was having more frequent headaches.  She did have 2 episodes recently of visual aura, not followed by headache.  The first was right after her COVID-vaccine, the other 6 months later.  No aura or visual changes with her typical migraines.     Triggers: Dehydration, alcohol.     More recently she has developed some  facial pain on the Right as well. She saw a head and neck specialist pain specialist and will be doing physical therapy for TMJ dysfunction.  She does endorse some neck pain with her headaches.  This has improved from previous, when she was also having that arm numbness which remains resolved.     Her headaches have been less frequent over the past 6 months. She takes the rizatriptan several times per month. A bad month would be 10 headaches.  She is better about taking the rizatriptan right away rather than waiting.  It makes her little sleepy but otherwise she tolerates it well and it is almost always effective.  Rarely she takes a second dose.  Typically she gets her migraines in the morning. Without treatment: 2-3 days.  She denies nausea, dizziness, weakness or sensory changes with her headaches.  She does endorse light and sound sensitivity.     She says that she has tried motrin without effect.     She was also Flexeril for her jaw pain which does help.   She says that the gabapentin was started when she was having the increased neck pain and numbness.     Vanessa's mother had migraines.  She also had cerebrovascular disease, significant and untreated, noted on her death certificate.  Vanessa wonders about her own vessel health.     I ordered a CTA of the head and neck which did not show any significant stenosis nor aneurysms.  Our plan was to continue the rizatriptan and have her do some physical therapy for the jaw/facial pain.  I also asked the patient to consider acupuncture treatments for headache prevention.    Vanessa says the headaches are about the same. She says it seems that weather changes (spring and fall). She typically takes the rizatriptan about 5 times per month and 1 dose is usually effective.  She cannot recall a recent time when she had to take 2 doses.    She is still having the facial pain on the right for which she tried a Botox treatment but this did not seem to really relax the jaw.  She  has not tried more than 10mg of the cyclobenzaprine for this either.  She does not follow-up with her dentist to potentially do another Botox treatment.  She mentions that she has required more Botox in her forehead than the usual dose given in the past.    She mentions that Yaa Lino ([facility] headache specialist) is a neighbor and she mentioned that her insurance does not cover acupuncture.  Dr. Lino said that she could find a place around town, some of which are affordable out-of-pocket. Vanessa's  is a semi-retired dentist.    CURRENT MEDICATIONS:   gabapentin (NEURONTIN) 300 MG capsule, Take 300 mg by mouth At Bedtime (Patient not taking: Reported on 8/31/2022)    No current facility-administered medications on file prior to visit.      RECENT DIAGNOSTIC STUDIES:   Labs: No results found for any visits on 03/02/23.    Imaging:   CTA Head/Neck (9.15.22):  IMPRESSION:   HEAD CT:  1.  Normal head CT.     HEAD CTA:   1.  Normal CTA [place].     NECK CTA:  1.  Normal neck CTA.    Imaging reviewed independently by me.  Agree with radiology read.    REVIEW OF SYSTEMS:                                                      10-point review of systems is negative except as mentioned above in HPI.    EXAM:                                                      Physical Exam:   Vitals: BP 95/61   Pulse 74   Wt 81.6 kg (180 lb)   LMP  (LMP Unknown)   BMI 25.83 kg/m    BMI= Body mass index is 25.83 kg/m .  GENERAL: NAD.  HEENT: NC/AT. No TTP of jaw or cheeks.  CV: RRR. S1, S2.   NECK: No bruits.  PULM: Non-labored breathing.   Neurologic:  MENTAL STATUS: Alert, attentive. Speech is fluent. Normal comprehension. Normal concentration. Adequate fund of knowledge.   CRANIAL NERVES: Discs flat. Visual fields intact to confrontation. Pupils equally, round and reactive to light. Facial sensation and movement normal. EOM full. Hearing intact to conversation. Trapezius strength intact. Palate moves  symmetrically. Tongue midline.  MOTOR: 5/5 in proximal and distal muscle groups of upper and lower extremities. Tone and bulk normal.   DTRs: Intact and symmetric in biceps, BR, patellae.  Babinski down-going bilaterally.   SENSATION: Normal light touch and pinprick. Vibration: Slightly decreased at both ankles.   COORDINATION: Normal finger nose finger.   STATION AND GAIT: Casual gait is normal.  Right hand-dominant.    ASSESSMENT and PLAN:                                                      Assessment:    ICD-10-CM    1. Nonintractable migraine, unspecified migraine type  G43.009 rizatriptan (MAXALT) 10 MG tablet      2. Atypical facial pain  G50.1 cyclobenzaprine (FLEXERIL) 10 MG tablet    TMJ dysfxn?      3. Encounter for long-term (current) use of medications  Z79.899            Ms. Santos is a pleasant 63-year-old woman here for follow-up regarding migraine headaches.  She also has some facial pain, possible related to TMJ dysfunction and clenching of her teeth at night.  I encouraged Vanessa to follow-up with regards to Botox treatments.  I agree with her that perhaps the dose she was given was not enough to relax the muscles in her jaw.  Another option would be to try her on a higher dose of Flexeril around bedtime to see if she wakes up in less pain.  For the migraines we will continue to rizatriptan which tends to be effective in resolving her headaches.  We also talked about that for both conditions acupuncture is still a reasonable option.  She will check to find a clinic near her home since her insurance does not cover it.  I would like to see Vanessa back in clinic again in about 6 months.  She understands and agrees with the plan.    Plan:  -- Continue the rizatriptan as needed for migraine symptoms.  -- Check around town to see if you can find an affordable acupuncturist.  -- I agree with your plan to follow-up with regards to the Botox treatments for the facial pain.    -- In the meantime it would  certainly be reasonable for you to try taking 20mg of the cyclobenzaprine in the evening to see if your pain is less prominent in the morning.  -- Return to neurology clinic in 6 months.    Total Time: 30 minutes were spent with the patient and in chart review/documentation (as described above in Assessment and Plan)/coordinating the care on date of service.    Jocelyn Puga MD  Neurology    Dragon software used in the dictation of this note.

## 2023-05-29 NOTE — LETTER
3/2/2023         RE: Vanessa Santos  [address] [facility] [address]        Dear Colleague,    Thank you for referring your patient, Vanessa Santos, to the [facility] NEUROLOGY CLINIC [place]. Please see a copy of my visit note below.    ESTABLISHED PATIENT NEUROLOGY NOTE    DATE OF VISIT: 3/2/2023  MRN: [id number]  PATIENT NAME: Vanessa Santos  YOB: 1960    Chief Complaint   Patient presents with     Headache     6 mo follow-up        SUBJECTIVE:                                                      HISTORY OF PRESENT ILLNESS:  Vanessa is here for follow up regarding facial pain and headaches.  I met the patient for a transfer of care about 6 months ago.    History as previously documented by me (8.31.22):  Per chart review, the patient previously followed with Dr. Villalpando, most recently seen in 1.2021.  Plan at that time was to do alternating rizatriptan and Fioricet as needed and an occipital nerve block on the right.  Over-the-counter medications recommended as well, alternating the rizatriptan with ibuprofen + Tylenol.  She was complaining of right arm this initially but this had improved.  She described posterior right-sided headache spreading forward to the temporal area.  EMG of the right arm was normal.  Brain MRI showed some age-related changes.  Multilevel degenerative changes on cervical MRI.     Vanessa is here alone today.  She says her migraines have changed some in distribution and that the used to always be behind the right eye but now the pain is more prominently in the temple.  Recent ESR was normal.  She says the pain does still at times migrated from the right occiput to the front.  She does endorse some occipital tenderness when she is in pain.  She does not think the occipital nerve block did anything in terms of decreasing her migraines.  She recalls that around that time when she last saw Dr. Villalpando she was having more frequent headaches.  She did have 2  Pt not in room nor US   episodes recently of visual aura, not followed by headache.  The first was right after her COVID-vaccine, the other 6 months later.  No aura or visual changes with her typical migraines.     Triggers: Dehydration, alcohol.     More recently she has developed some facial pain on the Right as well. She saw a head and neck specialist pain specialist and will be doing physical therapy for TMJ dysfunction.  She does endorse some neck pain with her headaches.  This has improved from previous, when she was also having that arm numbness which remains resolved.     Her headaches have been less frequent over the past 6 months. She takes the rizatriptan several times per month. A bad month would be 10 headaches.  She is better about taking the rizatriptan right away rather than waiting.  It makes her little sleepy but otherwise she tolerates it well and it is almost always effective.  Rarely she takes a second dose.  Typically she gets her migraines in the morning. Without treatment: 2-3 days.  She denies nausea, dizziness, weakness or sensory changes with her headaches.  She does endorse light and sound sensitivity.     She says that she has tried motrin without effect.     She was also Flexeril for her jaw pain which does help.   She says that the gabapentin was started when she was having the increased neck pain and numbness.     Vanessa's mother had migraines.  She also had cerebrovascular disease, significant and untreated, noted on her death certificate.  Vanessa wonders about her own vessel health.     I ordered a CTA of the head and neck which did not show any significant stenosis nor aneurysms.  Our plan was to continue the rizatriptan and have her do some physical therapy for the jaw/facial pain.  I also asked the patient to consider acupuncture treatments for headache prevention.    Vanessa says the headaches are about the same. She says it seems that weather changes (spring and fall). She typically takes the rizatriptan  about 5 times per month and 1 dose is usually effective.  She cannot recall a recent time when she had to take 2 doses.    She is still having the facial pain on the right for which she tried a Botox treatment but this did not seem to really relax the jaw.  She has not tried more than 10mg of the cyclobenzaprine for this either.  She does not follow-up with her dentist to potentially do another Botox treatment.  She mentions that she has required more Botox in her forehead than the usual dose given in the past.    She mentions that Yaa Lino ([facility] headache specialist) is a neighbor and she mentioned that her insurance does not cover acupuncture.  Dr. Lino said that she could find a place around town, some of which are affordable out-of-pocket. Vanessa's  is a semi-retired dentist.    CURRENT MEDICATIONS:   gabapentin (NEURONTIN) 300 MG capsule, Take 300 mg by mouth At Bedtime (Patient not taking: Reported on 8/31/2022)    No current facility-administered medications on file prior to visit.      RECENT DIAGNOSTIC STUDIES:   Labs: No results found for any visits on 03/02/23.    Imaging:   CTA Head/Neck (9.15.22):  IMPRESSION:   HEAD CT:  1.  Normal head CT.     HEAD CTA:   1.  Normal CTA [place] [facility] [place].     NECK CTA:  1.  Normal neck CTA.    Imaging reviewed independently by me.  Agree with radiology read.    REVIEW OF SYSTEMS:                                                      10-point review of systems is negative except as mentioned above in HPI.    EXAM:                                                      Physical Exam:   Vitals: BP 95/61   Pulse 74   Wt 81.6 kg (180 lb)   LMP  (LMP Unknown)   BMI 25.83 kg/m    BMI= Body mass index is 25.83 kg/m .  GENERAL: NAD.  HEENT: NC/AT. No TTP of jaw or cheeks.  CV: RRR. S1, S2.   NECK: No bruits.  PULM: Non-labored breathing.   Neurologic:  MENTAL STATUS: Alert, attentive. Speech is fluent. Normal comprehension. Normal concentration.  Adequate fund of knowledge.   CRANIAL NERVES: Discs flat. Visual fields intact to confrontation. Pupils equally, round and reactive to light. Facial sensation and movement normal. EOM full. Hearing intact to conversation. Trapezius strength intact. Palate moves symmetrically. Tongue midline.  MOTOR: 5/5 in proximal and distal muscle groups of upper and lower extremities. Tone and bulk normal.   DTRs: Intact and symmetric in biceps, BR, patellae.  Babinski down-going bilaterally.   SENSATION: Normal light touch and pinprick. Vibration: Slightly decreased at both ankles.   COORDINATION: Normal finger nose finger.   STATION AND GAIT: Casual gait is normal.  Right hand-dominant.    ASSESSMENT and PLAN:                                                      Assessment:    ICD-10-CM    1. Nonintractable migraine, unspecified migraine type  G43.009 rizatriptan (MAXALT) 10 MG tablet      2. Atypical facial pain  G50.1 cyclobenzaprine (FLEXERIL) 10 MG tablet    TMJ dysfxn?      3. Encounter for long-term (current) use of medications  Z79.899            Ms. Santos is a pleasant 63-year-old woman here for follow-up regarding migraine headaches.  She also has some facial pain, possible related to TMJ dysfunction and clenching of her teeth at night.  I encouraged Vanessa to follow-up with regards to Botox treatments.  I agree with her that perhaps the dose she was given was not enough to relax the muscles in her jaw.  Another option would be to try her on a higher dose of Flexeril around bedtime to see if she wakes up in less pain.  For the migraines we will continue to rizatriptan which tends to be effective in resolving her headaches.  We also talked about that for both conditions acupuncture is still a reasonable option.  She will check to find a clinic near her home since her insurance does not cover it.  I would like to see Vanessa back in clinic again in about 6 months.  She understands and agrees with the plan.    Plan:  --  Continue the rizatriptan as needed for migraine symptoms.  -- Check around town to see if you can find an affordable acupuncturist.  -- I agree with your plan to follow-up with regards to the Botox treatments for the facial pain.    -- In the meantime it would certainly be reasonable for you to try taking 20mg of the cyclobenzaprine in the evening to see if your pain is less prominent in the morning.  -- Return to neurology clinic in 6 months.    Total Time: 30 minutes were spent with the patient and in chart review/documentation (as described above in Assessment and Plan)/coordinating the care on date of service.    Jocelyn Puga MD  Neurology    Dragon software used in the dictation of this note.                        Again, thank you for allowing me to participate in the care of your patient.        Sincerely,        Jocelyn Puga MD

## 2023-06-08 ENCOUNTER — HOSPITAL ENCOUNTER (OUTPATIENT)
Dept: MAMMOGRAPHY | Facility: CLINIC | Age: 63
Discharge: HOME | End: 2023-06-08
Attending: FAMILY MEDICINE | Admitting: FAMILY MEDICINE
Payer: COMMERCIAL

## 2023-06-08 DIAGNOSIS — Z12.31 VISIT FOR SCREENING MAMMOGRAM: ICD-10-CM

## 2023-06-08 PROCEDURE — 77067 SCR MAMMO BI INCL CAD: CPT

## 2023-08-25 ENCOUNTER — TELEPHONE (OUTPATIENT)
Dept: NEUROLOGY | Facility: CLINIC | Age: 63
End: 2023-08-25
Payer: COMMERCIAL

## 2023-08-25 DIAGNOSIS — G43.009 NONINTRACTABLE MIGRAINE, UNSPECIFIED MIGRAINE TYPE: ICD-10-CM

## 2023-08-25 RX ORDER — RIZATRIPTAN BENZOATE 10 MG/1
10 TABLET ORAL
Qty: 18 TABLET | Refills: 0 | Status: SHIPPED | OUTPATIENT
Start: 2023-08-25 | End: 2023-09-12

## 2023-08-25 NOTE — TELEPHONE ENCOUNTER
LOV: 3/2/23    NOV: 09/12/2023    Per LOV note:   Plan:  -- Continue the rizatriptan as needed for migraine symptoms.    Refill: Signed Prescriptions:                        Disp   Refills    rizatriptan (MAXALT) 10 MG tablet          18 tab*0        Sig: Take 1 tablet (10 mg) by mouth at onset of headache           for migraine  Authorizing Provider: BIPIN HUBBARD  Ordering User: COLT LAW RN, BSN  [facility] Neurology

## 2023-09-12 ENCOUNTER — OFFICE VISIT (OUTPATIENT)
Dept: NEUROLOGY | Facility: CLINIC | Age: 63
End: 2023-09-12
Payer: COMMERCIAL

## 2023-09-12 VITALS — HEART RATE: 70 BPM | SYSTOLIC BLOOD PRESSURE: 111 MMHG | DIASTOLIC BLOOD PRESSURE: 67 MMHG

## 2023-09-12 DIAGNOSIS — Z79.899 ENCOUNTER FOR LONG-TERM (CURRENT) USE OF MEDICATIONS: ICD-10-CM

## 2023-09-12 DIAGNOSIS — G43.009 NONINTRACTABLE MIGRAINE, UNSPECIFIED MIGRAINE TYPE: Primary | ICD-10-CM

## 2023-09-12 PROCEDURE — 99213 OFFICE O/P EST LOW 20 MIN: CPT | Performed by: PSYCHIATRY & NEUROLOGY

## 2023-09-12 RX ORDER — RIZATRIPTAN BENZOATE 10 MG/1
10 TABLET ORAL
Qty: 18 TABLET | Refills: 5 | Status: SHIPPED | OUTPATIENT
Start: 2023-09-12 | End: 2024-07-22

## 2023-09-12 RX ORDER — PREDNISONE 20 MG/1
20 TABLET ORAL DAILY
Qty: 5 TABLET | Refills: 1 | Status: SHIPPED | OUTPATIENT
Start: 2023-09-12 | End: 2024-07-22

## 2023-09-12 NOTE — PROGRESS NOTES
ESTABLISHED PATIENT NEUROLOGY NOTE    DATE OF VISIT: 9/12/2023  MRN: [id number]  PATIENT NAME: Vanessa Santos  YOB: 1960    Chief Complaint   Patient presents with    Headache     6 mo follow-up   About the same  A week ago, patient had a headache for 5 days straight. Will use Rizatriptan but doesn't help get rid of the headache      SUBJECTIVE:                                                      HISTORY OF PRESENT ILLNESS:  Vanessa is here for follow up regarding facial pain and headaches.  She previously followed with Dr. Villalpando in this clinic.  She had been on rizatriptan and Fioricet and underwent a right occipital nerve block in the past.  Brain MRI showed age-related changes, multilevel degenerative changes on cervical imaging.  When we met she reported that her migraines had changed to involve the right temple more prominently than behind the right eye as before.  She told me that she did not think that the occipital nerve block had helped with her migraines.  Triggers are dehydration and alcohol.  For the facial pain, Vanessa has also seen head and neck pain specialist and done therapy for TMJ dysfunction.  Previous history of right arm numbness, normal EMG, symptoms resolved.  CTA of the head and neck did not show any significant stenosis.  When I first met the patient our plan was to continue the rizatriptan and to have her undergo some physical therapy for the jaw/facial pain.  Upon follow-up she reported the headaches were the same.  She had tried Botox without clear benefit for her jaw.  10mg of cyclobenzaprine was also tried.  She was going to consider finding an acupuncturist around town. I encouraged Vanessa to also follow-up with her dentist with regards to repeating the Botox treatments.  We also talked about bumping up the cyclobenzaprine dose to 20mg at bedtime to see if this helps to resolve her morning symptoms.    Vanessa says she had one prolonged cluster of headaches recently. Before  that it was probably about a month prior. The rizatriptan continues to be helpful.  As long as she is able to take the rizatriptan each day that she has a headache she feels that it is manageable.  She thinks that her most recent Botox was not enough in terms of volume (she tends to need more than the average).  She has met her deductible so she plans on seeing her dentist for one more treatment.  She does have a good mouthguard which has helped with the jaw pain.   She takes the muscle relaxer occasionally. Hard to say if this is helpful.     She has seen a chiropractor who has done some massage which is helpful.  She has not yet looked into acupuncture because she has been doing well.  Overall she feels that her headaches are under adequate control.    CURRENT MEDICATIONS:   gabapentin (NEURONTIN) 300 MG capsule, Take 300 mg by mouth At Bedtime (Patient not taking: Reported on 8/31/2022)    No current facility-administered medications on file prior to visit.      RECENT DIAGNOSTIC STUDIES:   Labs: No results found for any visits on 09/12/23.    REVIEW OF SYSTEMS:                                                      10-point review of systems is negative except as mentioned above in HPI.    EXAM:                                                      Physical Exam:   Vitals: /67   Pulse 70   LMP  (LMP Unknown)   BMI= There is no height or weight on file to calculate BMI.  GENERAL: NAD.  HEENT: NC/AT.  CV: RRR. S1, S2.   NECK: No bruits.  PULM: Non-labored breathing.   Neurologic:  MENTAL STATUS: Alert, attentive. Speech is fluent. Normal comprehension. Normal concentration. Adequate fund of knowledge.   CRANIAL NERVES: Discs flat. Visual fields intact to confrontation. Pupils equally, round and reactive to light. Facial sensation and movement normal. EOM full. Hearing intact to conversation. Trapezius strength intact. Palate moves symmetrically. Tongue midline.  MOTOR: 5/5 in proximal and distal muscle groups  of upper and lower extremities. Tone and bulk normal.   SENSATION: Normal light touch and pinprick. Vibration: Slightly decreased at both ankles.   COORDINATION: Normal finger nose finger.   STATION AND GAIT: Casual gait is normal.  Right hand-dominant    ASSESSMENT and PLAN:                                                      Assessment:    ICD-10-CM    1. Nonintractable migraine, unspecified migraine type  G43.009 predniSONE (DELTASONE) 20 MG tablet     rizatriptan (MAXALT) 10 MG tablet      2. Encounter for long-term (current) use of medications  Z79.899           Ms. Santos is a pleasant 63-year-old woman here for follow-up regarding migraine headaches.  Rizatriptan continues to be effective in resolving her headaches but she did have a recent cluster of headaches over a few days.  I do not suspect medication overuse as part of the issue so we discussed this today.  One option would be to add a short course of prednisone if she has another cluster of headaches.  In the meantime, chiropractic treatments and dental treatments continue to be recommended.  We will plan to follow-up again in 6 months.  Kira expressed understanding and agreement with the plan.    Plan:  -- Continue the rizatriptan as needed for migraine symptoms.  -- I have also put in the order for a course of prednisone if needed for prolonged/cluster headaches  -- I agree with your plan to follow-up with another Botox treatment for the jaw pain.  -- Acupuncture as needed if the headaches become more frequent again.  -- Return to neurology clinic in 6 months.  Please let us know if any concerns arise in the meantime.    Total Time: 20 minutes were spent with the patient and in chart review/documentation (as described above in Assessment and Plan)/coordinating the care on date of service.    Jocelyn Puga MD  Neurology    Dragon software used in the dictation of this note.

## 2023-09-12 NOTE — PATIENT INSTRUCTIONS
Plan:  -- Continue the rizatriptan as needed for migraine symptoms.  -- I have also put in the order for a course of prednisone if needed for prolonged/cluster headaches  -- I agree with your plan to follow-up with another Botox treatment for the jaw pain.  -- Acupuncture as needed if the headaches become more frequent again.  -- Return to neurology clinic in 6 months.  Please let us know if any concerns arise in the meantime.

## 2023-09-12 NOTE — LETTER
9/12/2023         RE: Vanessa Santos  [address] [facility] [address]        Dear Colleague,    Thank you for referring your patient, Vanessa Santos, to the [facility] NEUROLOGY CLINIC [place]. Please see a copy of my visit note below.    ESTABLISHED PATIENT NEUROLOGY NOTE    DATE OF VISIT: 9/12/2023  MRN: [id number]  PATIENT NAME: Vanessa Santos  YOB: 1960    Chief Complaint   Patient presents with     Headache     6 mo follow-up   About the same  A week ago, patient had a headache for 5 days straight. Will use Rizatriptan but doesn't help get rid of the headache      SUBJECTIVE:                                                      HISTORY OF PRESENT ILLNESS:  Vanessa is here for follow up regarding facial pain and headaches.  She previously followed with Dr. Villalpando in this clinic.  She had been on rizatriptan and Fioricet and underwent a right occipital nerve block in the past.  Brain MRI showed age-related changes, multilevel degenerative changes on cervical imaging.  When we met she reported that her migraines had changed to involve the right temple more prominently than behind the right eye as before.  She told me that she did not think that the occipital nerve block had helped with her migraines.  Triggers are dehydration and alcohol.  For the facial pain, Vanessa has also seen head and neck pain specialist and done therapy for TMJ dysfunction.  Previous history of right arm numbness, normal EMG, symptoms resolved.  CTA of the head and neck did not show any significant stenosis.  When I first met the patient our plan was to continue the rizatriptan and to have her undergo some physical therapy for the jaw/facial pain.  Upon follow-up she reported the headaches were the same.  She had tried Botox without clear benefit for her jaw.  10mg of cyclobenzaprine was also tried.  She was going to consider finding an acupuncturist around [facility]. I encouraged Vanessa to also follow-up  with her dentist with regards to repeating the Botox treatments.  We also talked about bumping up the cyclobenzaprine dose to 20mg at bedtime to see if this helps to resolve her morning symptoms.    Vanessa says she had one prolonged cluster of headaches recently. Before that it was probably about a month prior. The rizatriptan continues to be helpful.  As long as she is able to take the rizatriptan each day that she has a headache she feels that it is manageable.  She thinks that her most recent Botox was not enough in terms of volume (she tends to need more than the average).  She has met her deductible so she plans on seeing her dentist for one more treatment.  She does have a good mouthguard which has helped with the jaw pain.   She takes the muscle relaxer occasionally. Hard to say if this is helpful.     She has seen a chiropractor who has done some massage which is helpful.  She has not yet looked into acupuncture because she has been doing well.  Overall she feels that her headaches are under adequate control.    CURRENT MEDICATIONS:   gabapentin (NEURONTIN) 300 MG capsule, Take 300 mg by mouth At Bedtime (Patient not taking: Reported on 8/31/2022)    No current facility-administered medications on file prior to visit.      RECENT DIAGNOSTIC STUDIES:   Labs: No results found for any visits on 09/12/23.    REVIEW OF SYSTEMS:                                                      10-point review of systems is negative except as mentioned above in HPI.    EXAM:                                                      Physical Exam:   Vitals: /67   Pulse 70   LMP  (LMP Unknown)   BMI= There is no height or weight on file to calculate BMI.  GENERAL: NAD.  HEENT: NC/AT.  CV: RRR. S1, S2.   NECK: No bruits.  PULM: Non-labored breathing.   Neurologic:  MENTAL STATUS: Alert, attentive. Speech is fluent. Normal comprehension. Normal concentration. Adequate fund of knowledge.   CRANIAL NERVES: Discs flat. Visual fields  intact to confrontation. Pupils equally, round and reactive to light. Facial sensation and movement normal. EOM full. Hearing intact to conversation. Trapezius strength intact. Palate moves symmetrically. Tongue midline.  MOTOR: 5/5 in proximal and distal muscle groups of upper and lower extremities. Tone and bulk normal.   SENSATION: Normal light touch and pinprick. Vibration: Slightly decreased at both ankles.   COORDINATION: Normal finger nose finger.   STATION AND GAIT: Casual gait is normal.  Right hand-dominant    ASSESSMENT and PLAN:                                                      Assessment:    ICD-10-CM    1. Nonintractable migraine, unspecified migraine type  G43.009 predniSONE (DELTASONE) 20 MG tablet     rizatriptan (MAXALT) 10 MG tablet      2. Encounter for long-term (current) use of medications  Z79.899           Ms. Santos is a pleasant 63-year-old woman here for follow-up regarding migraine headaches.  Rizatriptan continues to be effective in resolving her headaches but she did have a recent cluster of headaches over a few days.  I do not suspect medication overuse as part of the issue so we discussed this today.  One option would be to add a short course of prednisone if she has another cluster of headaches.  In the meantime, chiropractic treatments and dental treatments continue to be recommended.  We will plan to follow-up again in 6 months.  Kira expressed understanding and agreement with the plan.    Plan:  -- Continue the rizatriptan as needed for migraine symptoms.  -- I have also put in the order for a course of prednisone if needed for prolonged/cluster headaches  -- I agree with your plan to follow-up with another Botox treatment for the jaw pain.  -- Acupuncture as needed if the headaches become more frequent again.  -- Return to neurology clinic in 6 months.  Please let us know if any concerns arise in the meantime.    Total Time: 20 minutes were spent with the patient and in chart  review/documentation (as described above in Assessment and Plan)/coordinating the care on date of service.    Jocelyn Puga MD  Neurology    Dragon software used in the dictation of this note.                    Again, thank you for allowing me to participate in the care of your patient.        Sincerely,        Jocelyn Puga MD

## 2023-10-14 ENCOUNTER — HEALTH MAINTENANCE LETTER (OUTPATIENT)
Age: 63
End: 2023-10-14

## 2024-06-19 ENCOUNTER — HOSPITAL ENCOUNTER (OUTPATIENT)
Dept: MAMMOGRAPHY | Facility: CLINIC | Age: 64
Discharge: HOME | End: 2024-06-19
Attending: FAMILY MEDICINE | Admitting: FAMILY MEDICINE
Payer: COMMERCIAL

## 2024-06-19 DIAGNOSIS — Z12.31 VISIT FOR SCREENING MAMMOGRAM: ICD-10-CM

## 2024-06-19 PROCEDURE — 77063 BREAST TOMOSYNTHESIS BI: CPT

## 2024-07-21 ASSESSMENT — MIGRAINE DISABILITY ASSESSMENT (MIDAS)
TOTAL SCORE: 23
ON A SCALE FROM 0-10 ON AVERAGE HOW PAINFUL WERE HEADACHES: 4
HOW MANY DAYS IN THE PAST 3 MONTHS HAVE YOU HAD A HEADACHE: 20
HOW MANY DAYS WAS YOUR PRODUCTIVITY CUT IN HALF BECAUSE OF HEADACHES: 0
HOW MANY DAYS WAS HOUSEWORK PRODUCTIVITY CUT IN HALF DUE TO HEADACHES: 15
HOW MANY DAYS DID YOU MISS WORK OR SCHOOL BECAUSE OF HEADACHES: 0
HOW OFTEN WERE SOCIAL ACTIVITIES MISSED DUE TO HEADACHES: 5
HOW MANY DAYS DID YOU NOT DO HOUSEWORK BECAUSE OF HEADACHES: 3

## 2024-07-21 ASSESSMENT — HEADACHE IMPACT TEST (HIT 6)
WHEN YOU HAVE A HEADACHE HOW OFTEN DO YOU WISH YOU COULD LIE DOWN: ALWAYS
WHEN YOU HAVE HEADACHES HOW OFTEN IS THE PAIN SEVERE: SOMETIMES
HIT6 TOTAL SCORE: 69
HOW OFTEN DO HEADACHES LIMIT YOUR DAILY ACTIVITIES: VERY OFTEN
HOW OFTEN HAVE YOU FELT FED UP OR IRRITATED BECAUSE OF YOUR HEADACHES: ALWAYS
HOW OFTEN HAVE YOU FELT TOO TIRED TO WORK BECAUSE OF YOUR HEADACHES: VERY OFTEN
HOW OFTEN DID HEADACHS LIMIT CONCENTRATION ON WORK OR DAILY ACTIVITY: VERY OFTEN

## 2024-07-22 ENCOUNTER — OFFICE VISIT (OUTPATIENT)
Dept: NEUROLOGY | Facility: CLINIC | Age: 64
End: 2024-07-22
Payer: COMMERCIAL

## 2024-07-22 VITALS
SYSTOLIC BLOOD PRESSURE: 124 MMHG | WEIGHT: 193.4 LBS | BODY MASS INDEX: 27.75 KG/M2 | HEART RATE: 66 BPM | DIASTOLIC BLOOD PRESSURE: 72 MMHG

## 2024-07-22 DIAGNOSIS — G43.009 NONINTRACTABLE MIGRAINE, UNSPECIFIED MIGRAINE TYPE: Primary | ICD-10-CM

## 2024-07-22 DIAGNOSIS — G50.1 ATYPICAL FACIAL PAIN: ICD-10-CM

## 2024-07-22 DIAGNOSIS — R25.3 EYE MUSCLE TWITCHES: ICD-10-CM

## 2024-07-22 DIAGNOSIS — Z79.899 ENCOUNTER FOR LONG-TERM (CURRENT) USE OF MEDICATIONS: ICD-10-CM

## 2024-07-22 PROCEDURE — 99213 OFFICE O/P EST LOW 20 MIN: CPT | Performed by: PSYCHIATRY & NEUROLOGY

## 2024-07-22 RX ORDER — RIZATRIPTAN BENZOATE 10 MG/1
10 TABLET ORAL
Qty: 18 TABLET | Refills: 5 | Status: SHIPPED | OUTPATIENT
Start: 2024-07-22

## 2024-07-22 RX ORDER — PREDNISONE 20 MG/1
20 TABLET ORAL DAILY
Qty: 5 TABLET | Refills: 1 | Status: SHIPPED | OUTPATIENT
Start: 2024-07-22

## 2024-07-22 NOTE — PATIENT INSTRUCTIONS
Plan:  Continue rizatriptan as needed for migraines.  Short course of prednisone if needed for prolonged headaches.  Continue the acupuncture.  Hopefully this will decrease the frequency of your headaches.  Labs: Metabolic panel with calcium and magnesium.  We will notify you of the results.  Be sure to stay well-hydrated.  Return to neurology clinic in 1 year.  Let us know if any concerns arise in the meantime.

## 2024-07-22 NOTE — LETTER
7/22/2024      Vanessa Santos  [address] [facility] [address]      Dear Colleague,    Thank you for referring your patient, Vanessa Santos, to the [facility] [place]. Please see a copy of my visit note below.    ESTABLISHED PATIENT NEUROLOGY NOTE    DATE OF VISIT: 7/22/2024  MRN: [id number]  PATIENT NAME: Vanessa Santos  YOB: 1960    Chief Complaint   Patient presents with     Headache     Symptoms about the same.   Patient is having L eye twitch for about a month.      SUBJECTIVE:                                                      HISTORY OF PRESENT ILLNESS:  Vanessa is here for follow up regarding facial pain and headaches.    History as previously documented by me (9.12.23):  She previously followed with Dr. Villalpando in this clinic.  She had been on rizatriptan and Fioricet and underwent a right occipital nerve block in the past.  Brain MRI showed age-related changes, multilevel degenerative changes on cervical imaging.  When we met she reported that her migraines had changed to involve the right temple more prominently than behind the right eye as before.  She told me that she did not think that the occipital nerve block had helped with her migraines.  Triggers are dehydration and alcohol.  For the facial pain, Vanessa has also seen head and neck pain specialist and done therapy for TMJ dysfunction.  Previous history of right arm numbness, normal EMG, symptoms resolved.  CTA of the head and neck did not show any significant stenosis.  When I first met the patient our plan was to continue the rizatriptan and to have her undergo some physical therapy for the jaw/facial pain.  Upon follow-up she reported the headaches were the same.  She had tried Botox without clear benefit for her jaw.  10mg of cyclobenzaprine was also tried.  She was going to consider finding an acupuncturist around [facility]. I encouraged Vanessa to also follow-up with her dentist with regards to  repeating the Botox treatments.  We also talked about bumping up the cyclobenzaprine dose to 20mg at bedtime to see if this helps to resolve her morning symptoms.     Vanessa says she had one prolonged cluster of headaches recently. Before that it was probably about a month prior. The rizatriptan continues to be helpful.  As long as she is able to take the rizatriptan each day that she has a headache she feels that it is manageable.  She thinks that her most recent Botox was not enough in terms of volume (she tends to need more than the average).  She has met her deductible so she plans on seeing her dentist for one more treatment.  She does have a good mouthguard which has helped with the jaw pain.   She takes the muscle relaxer occasionally. Hard to say if this is helpful.      She has seen a chiropractor who has done some massage which is helpful.  She has not yet looked into acupuncture because she has been doing well.  Overall she feels that her headaches are under adequate control.    Our plan was to continue the rizatriptan as needed and prednisone for prolonged headache.  Plan was to continue Botox treatments and acupuncture as needed if the headaches were to become more frequent again.    She is done with her Botox treatments. These were not helpful.  She does wear her mouthguard every night, and has quite a bit of jaw pain if she does not.  Started acupuncture a  couple of weeks ago.  She has had 2 sessions so far.  Not sure yet about the response.  She is aware that she has to do some more sessions before it can be made clear whether this is beneficial for her or not.    She says the rizatriptan helps resolve her headaches 98% of the time.  She did recently do a short course of prednisone as we had discussed in the past and this did seem to shorten her headache.  No change in headache characteristics.  No visual changes.        CURRENT MEDICATIONS:   Current Outpatient Medications   Medication Sig Dispense  Refill     predniSONE (DELTASONE) 20 MG tablet Take 1 tablet (20 mg) by mouth daily For persistent headache, 5 day course 5 tablet 1     rizatriptan (MAXALT) 10 MG tablet Take 1 tablet (10 mg) by mouth at onset of headache for migraine 18 tablet 5     gabapentin (NEURONTIN) 300 MG capsule Take 300 mg by mouth At Bedtime (Patient not taking: Reported on 8/31/2022)       No current facility-administered medications for this visit.       RECENT DIAGNOSTIC STUDIES:   Labs: No results found for any visits on 07/22/24.    REVIEW OF SYSTEMS:                                                      10-point review of systems is negative except as mentioned above in HPI.    EXAM:                                                      Physical Exam:   Vitals: /72   Pulse 66   Wt 87.7 kg (193 lb 6.4 oz)   LMP  (LMP Unknown)   BMI 27.75 kg/m    BMI= Body mass index is 27.75 kg/m .  GENERAL: NAD.  HEENT: NC/AT.  CV: RRR, S1 and S2.   PULM: Non-labored breathing.   Neurologic:  MENTAL STATUS: Alert, attentive. Speech is fluent. Normal comprehension. Normal concentration. Adequate fund of knowledge.   CRANIAL NERVES: Visual fields intact to confrontation. Pupils equally, round and reactive to light. Facial sensation and movement normal. EOM full. Hearing intact to conversation. Trapezius strength intact. Palate moves symmetrically. Tongue midline.  MOTOR: 5/5 in proximal and distal muscle groups of upper and lower extremities. Tone and bulk normal.   SENSATION: Normal light touch and pinprick. Vibration: Slightly decreased at both ankles.   COORDINATION: Normal finger nose finger.   STATION AND GAIT: Casual gait is normal.  Right hand-dominant    ASSESSMENT and PLAN:                                                      Assessment:    ICD-10-CM    1. Nonintractable migraine, unspecified migraine type  G43.009 predniSONE (DELTASONE) 20 MG tablet     rizatriptan (MAXALT) 10 MG tablet      2. Eye muscle twitches  R25.3  Comprehensive metabolic panel     Magnesium      3. Encounter for long-term (current) use of medications  Z79.899       4. Atypical facial pain  G50.1           Plan:  Continue rizatriptan as needed for migraines.  Short course of prednisone if needed for prolonged headaches.  Continue the acupuncture.  Hopefully this will decrease the frequency of your headaches.  Labs: Metabolic panel with calcium and magnesium.  We will notify you of the results.  Be sure to stay well-hydrated.  Return to neurology clinic in 1 year.  Let us know if any concerns arise in the meantime.    Total Time: 25 minutes were spent with the patient and in chart review/documentation (as described above in Assessment and Plan)/coordinating the care on date of service.    Jocelyn Puga MD  Neurology    Dragon software used in the dictation of this note.                    Again, thank you for allowing me to participate in the care of your patient.        Sincerely,        Jocelyn Puga MD

## 2024-07-22 NOTE — PROGRESS NOTES
ESTABLISHED PATIENT NEUROLOGY NOTE    DATE OF VISIT: 7/22/2024  MRN: [id number]  PATIENT NAME: Vanessa Santos  YOB: 1960    Chief Complaint   Patient presents with    Headache     Symptoms about the same.   Patient is having L eye twitch for about a month.      SUBJECTIVE:                                                      HISTORY OF PRESENT ILLNESS:  Vanessa is here for follow up regarding facial pain and headaches.    History as previously documented by me (9.12.23):  She previously followed with Dr. Villalpando in this clinic.  She had been on rizatriptan and Fioricet and underwent a right occipital nerve block in the past.  Brain MRI showed age-related changes, multilevel degenerative changes on cervical imaging.  When we met she reported that her migraines had changed to involve the right temple more prominently than behind the right eye as before.  She told me that she did not think that the occipital nerve block had helped with her migraines.  Triggers are dehydration and alcohol.  For the facial pain, Vanessa has also seen head and neck pain specialist and done therapy for TMJ dysfunction.  Previous history of right arm numbness, normal EMG, symptoms resolved.  CTA of the head and neck did not show any significant stenosis.  When I first met the patient our plan was to continue the rizatriptan and to have her undergo some physical therapy for the jaw/facial pain.  Upon follow-up she reported the headaches were the same.  She had tried Botox without clear benefit for her jaw.  10mg of cyclobenzaprine was also tried.  She was going to consider finding an acupuncturist around town. I encouraged Vanessa to also follow-up with her dentist with regards to repeating the Botox treatments.  We also talked about bumping up the cyclobenzaprine dose to 20mg at bedtime to see if this helps to resolve her morning symptoms.     Vanessa says she had one prolonged cluster of headaches recently. Before that it was  probably about a month prior. The rizatriptan continues to be helpful.  As long as she is able to take the rizatriptan each day that she has a headache she feels that it is manageable.  She thinks that her most recent Botox was not enough in terms of volume (she tends to need more than the average).  She has met her deductible so she plans on seeing her dentist for one more treatment.  She does have a good mouthguard which has helped with the jaw pain.   She takes the muscle relaxer occasionally. Hard to say if this is helpful.      She has seen a chiropractor who has done some massage which is helpful.  She has not yet looked into acupuncture because she has been doing well.  Overall she feels that her headaches are under adequate control.    Our plan was to continue the rizatriptan as needed and prednisone for prolonged headache.  Plan was to continue Botox treatments and acupuncture as needed if the headaches were to become more frequent again.    She is done with her Botox treatments. These were not helpful.  She does wear her mouthguard every night, and has quite a bit of jaw pain if she does not.  Started acupuncture a  couple of weeks ago.  She has had 2 sessions so far.  Not sure yet about the response.  She is aware that she has to do some more sessions before it can be made clear whether this is beneficial for her or not.    She says the rizatriptan helps resolve her headaches 98% of the time.  She did recently do a short course of prednisone as we had discussed in the past and this did seem to shorten her headache.  No change in headache characteristics.  No visual changes.        CURRENT MEDICATIONS:   Current Outpatient Medications   Medication Sig Dispense Refill    predniSONE (DELTASONE) 20 MG tablet Take 1 tablet (20 mg) by mouth daily For persistent headache, 5 day course 5 tablet 1    rizatriptan (MAXALT) 10 MG tablet Take 1 tablet (10 mg) by mouth at onset of headache for migraine 18 tablet 5     gabapentin (NEURONTIN) 300 MG capsule Take 300 mg by mouth At Bedtime (Patient not taking: Reported on 8/31/2022)       No current facility-administered medications for this visit.       RECENT DIAGNOSTIC STUDIES:   Labs: No results found for any visits on 07/22/24.    REVIEW OF SYSTEMS:                                                      10-point review of systems is negative except as mentioned above in HPI.    EXAM:                                                      Physical Exam:   Vitals: /72   Pulse 66   Wt 87.7 kg (193 lb 6.4 oz)   LMP  (LMP Unknown)   BMI 27.75 kg/m    BMI= Body mass index is 27.75 kg/m .  GENERAL: NAD.  HEENT: NC/AT.  CV: RRR, S1 and S2.   PULM: Non-labored breathing.   Neurologic:  MENTAL STATUS: Alert, attentive. Speech is fluent. Normal comprehension. Normal concentration. Adequate fund of knowledge.   CRANIAL NERVES: Visual fields intact to confrontation. Pupils equally, round and reactive to light. Facial sensation and movement normal. EOM full. Hearing intact to conversation. Trapezius strength intact. Palate moves symmetrically. Tongue midline.  MOTOR: 5/5 in proximal and distal muscle groups of upper and lower extremities. Tone and bulk normal.   SENSATION: Normal light touch and pinprick. Vibration: Slightly decreased at both ankles.   COORDINATION: Normal finger nose finger.   STATION AND GAIT: Casual gait is normal.  Right hand-dominant    ASSESSMENT and PLAN:                                                      Assessment:    ICD-10-CM    1. Nonintractable migraine, unspecified migraine type  G43.009 predniSONE (DELTASONE) 20 MG tablet     rizatriptan (MAXALT) 10 MG tablet      2. Eye muscle twitches  R25.3 Comprehensive metabolic panel     Magnesium      3. Encounter for long-term (current) use of medications  Z79.899       4. Atypical facial pain  G50.1           Plan:  Continue rizatriptan as needed for migraines.  Short course of prednisone if needed for  prolonged headaches.  Continue the acupuncture.  Hopefully this will decrease the frequency of your headaches.  Labs: Metabolic panel with calcium and magnesium.  We will notify you of the results.  Be sure to stay well-hydrated.  Return to neurology clinic in 1 year.  Let us know if any concerns arise in the meantime.    Total Time: 25 minutes were spent with the patient and in chart review/documentation (as described above in Assessment and Plan)/coordinating the care on date of service.    Jocelyn Puga MD  Neurology    [employer] software used in the dictation of this note.

## 2024-12-01 ENCOUNTER — HEALTH MAINTENANCE LETTER (OUTPATIENT)
Age: 64
End: 2024-12-01

## 2025-06-23 ENCOUNTER — HOSPITAL ENCOUNTER (OUTPATIENT)
Dept: MAMMOGRAPHY | Facility: CLINIC | Age: 65
Discharge: HOME | End: 2025-06-23
Attending: FAMILY MEDICINE | Admitting: FAMILY MEDICINE
Payer: COMMERCIAL

## 2025-06-23 DIAGNOSIS — Z12.31 VISIT FOR SCREENING MAMMOGRAM: ICD-10-CM

## 2025-06-23 PROCEDURE — 77063 BREAST TOMOSYNTHESIS BI: CPT

## 2025-08-01 DIAGNOSIS — G43.009 NONINTRACTABLE MIGRAINE, UNSPECIFIED MIGRAINE TYPE: ICD-10-CM

## 2025-08-02 RX ORDER — RIZATRIPTAN BENZOATE 10 MG/1
TABLET ORAL
Qty: 18 TABLET | Refills: 2 | Status: SHIPPED | OUTPATIENT
Start: 2025-08-02

## 2025-09-04 ENCOUNTER — OFFICE VISIT (OUTPATIENT)
Dept: NEUROLOGY | Facility: CLINIC | Age: 65
End: 2025-09-04
Payer: COMMERCIAL

## 2025-09-04 VITALS
WEIGHT: 196.6 LBS | DIASTOLIC BLOOD PRESSURE: 84 MMHG | HEART RATE: 80 BPM | SYSTOLIC BLOOD PRESSURE: 134 MMHG | BODY MASS INDEX: 28.21 KG/M2

## 2025-09-04 DIAGNOSIS — G43.009 NONINTRACTABLE MIGRAINE, UNSPECIFIED MIGRAINE TYPE: Primary | ICD-10-CM

## 2025-09-04 RX ORDER — RIZATRIPTAN BENZOATE 10 MG/1
10 TABLET ORAL
Qty: 18 TABLET | Refills: 2 | Status: SHIPPED | OUTPATIENT
Start: 2025-09-04